# Patient Record
Sex: FEMALE | Race: BLACK OR AFRICAN AMERICAN | Employment: UNEMPLOYED | URBAN - METROPOLITAN AREA
[De-identification: names, ages, dates, MRNs, and addresses within clinical notes are randomized per-mention and may not be internally consistent; named-entity substitution may affect disease eponyms.]

---

## 2019-12-19 ENCOUNTER — OFFICE VISIT (OUTPATIENT)
Dept: ORTHOPEDIC SURGERY | Age: 69
End: 2019-12-19

## 2019-12-19 VITALS
HEIGHT: 64 IN | HEART RATE: 57 BPM | TEMPERATURE: 97 F | RESPIRATION RATE: 15 BRPM | SYSTOLIC BLOOD PRESSURE: 120 MMHG | BODY MASS INDEX: 48.49 KG/M2 | DIASTOLIC BLOOD PRESSURE: 50 MMHG | WEIGHT: 284 LBS

## 2019-12-19 DIAGNOSIS — M48.061 FORAMINAL STENOSIS OF LUMBAR REGION: Primary | ICD-10-CM

## 2019-12-19 DIAGNOSIS — E66.01 OBESITY, MORBID (HCC): ICD-10-CM

## 2019-12-19 DIAGNOSIS — M48.062 SPINAL STENOSIS, LUMBAR REGION WITH NEUROGENIC CLAUDICATION: ICD-10-CM

## 2019-12-19 RX ORDER — VITAMIN E 268 MG
400 CAPSULE ORAL DAILY
COMMUNITY

## 2019-12-19 NOTE — PROGRESS NOTES
HISTORY OF PRESENT ILLNESS    Derek Power is a 68y.o. year old female comes in today as new patient for: low back pain    Patients symptoms have been present for 18+ years. Pain level 10 - Worst pain ever/10 lower. It worsens with walking and during the day. Patient has tried:  Diclofenac, neurontin with benefit. It is described as pain low back worsening with time likely originally due to 2 different falls 2001. Had MRI Lumbar [id number] and \"nerve being pinched\". Does feel weakness with walking. Did PT his summer 2018 but \"did too much\" and seemed to make her worse. Was considering epidurals at [facility] but never heard back. IMAGING: XR lumbar 8/29/18 (Adrianna)  RADIOGRAPHIC STUDIES: Review of her imaging studies shows grade 1 spondylolisthesis at L4-L5, which appears to be stable on flexion extension views. There is lumbar stenosis at this level secondary to facet hypertrophy, which is severe. Past Surgical History:   Procedure Laterality Date    HX HYSTERECTOMY  1982    HX OTHER SURGICAL      gallbladder removal    HX OTHER SURGICAL      ORIF Tibia/Fibia    HX OTHER SURGICAL      varicose vein treatment     Social History     Socioeconomic History    Marital status:      Spouse name: Not on file    Number of children: Not on file    Years of education: Not on file    Highest education level: Not on file   Tobacco Use    Smoking status: Never Smoker    Smokeless tobacco: Never Used   Substance and Sexual Activity    Alcohol use: Not Currently      Current Outpatient Medications   Medication Sig Dispense Refill    vitamin E (AQUA GEMS) 400 unit capsule Take  by mouth daily.  ALBUTEROL IN Take 2 Puffs by inhalation.  calcium carbonate (OS-EBONY) 500 mg calcium (1,250 mg) tablet Take 1,250 mg by mouth.  cholecalciferol (VITAMIN D3) (2,000 UNITS /50 MCG) cap capsule Take 2,000 Units by mouth.  cyclobenzaprine (FLEXERIL) 10 mg tablet Take 10 mg by mouth.       conjugated estrogens (PREMARIN) 0.625 mg tablet Take 0.625 mg by mouth.  hydrocortisone (ANUSOL-HC) 25 mg supp Insert 25 mg into rectum.  loratadine (CLARITIN) 10 mg tablet Take 10 mg by mouth.  Phenazopyridine 97.5 mg tab Take  by mouth.  estradiol (ESTRACE) 0.01 % (0.1 mg/gram) vaginal cream Pea sized amount to urethra 2 x/week 42.5 g 2    oxybutynin chloride XL (DITROPAN XL) 10 mg CR tablet Take 1 Tab by mouth daily. 60 Tab 2    aspirin delayed-release 81 mg tablet Take 81 mg by mouth.  amLODIPine-benazepril (LOTREL) 10-20 mg per capsule Take 1 Cap by mouth.  atorvastatin (LIPITOR) 20 mg tablet Take 20 mg by mouth.  cyanocobalamin (VITAMIN B12) 500 mcg tablet Take 500 mcg by mouth.  diclofenac EC (VOLTAREN) 75 mg EC tablet Take 75 mg by mouth.  ergocalciferol (ERGOCALCIFEROL) 50,000 unit capsule Take 50,000 Units by mouth.  fexofenadine (ALLEGRA) 180 mg tablet Take 180 mg by mouth.  furosemide (LASIX) 20 mg tablet Take 20 mg by mouth.  gabapentin (NEURONTIN) 300 mg capsule Take 300 mg by mouth.  hydroCHLOROthiazide (HYDRODIURIL) 25 mg tablet Take 25 mg by mouth.  ibuprofen (MOTRIN) 800 mg tablet Take 800 mg by mouth.  levothyroxine (SYNTHROID) 50 mcg tablet Take 50 mcg by mouth.  meloxicam (MOBIC) 15 mg tablet Take 15 mg by mouth.  metoprolol tartrate (LOPRESSOR) 25 mg tablet Take 25 mg by mouth.  polyethylene glycol (MIRALAX) 17 gram/dose powder Take 17 g by mouth.  mometasone (NASONEX) 50 mcg/actuation nasal spray instill 2 sprays into each nostril once daily      omeprazole (PRILOSEC) 20 mg capsule Take 20 mg by mouth.  pregabalin (LYRICA) 75 mg capsule Take 75 mg by mouth.  rOPINIRole (REQUIP) 0.5 mg tablet Take 0.5 mg by mouth.  tiZANidine (ZANAFLEX) 4 mg tablet Take 2-4 mg by mouth.  traMADol-acetaminophen (ULTRACET) 37.5-325 mg per tablet Take  by mouth.       triamterene-hydroCHLOROthiazide (MAXZIDE) 37.5-25 mg per tablet Take  by mouth.  rivaroxaban (XARELTO) 20 mg tab tablet Take 20 mg by mouth. Past Medical History:   Diagnosis Date    Arthritis     Asthma     Diverticulosis     Dry eyes     Dry mouth     High cholesterol     Hypertension     Morbid obesity ([address]     Recurrent UTI     Right foot pain     Sleep apnea     Thyroid disease      Family History   Problem Relation Age of Onset    Diabetes Mother     Hypertension Mother     Hypertension Father     Heart Failure Father     Breast Cancer Sister        ROS:  + numbness both legs to feet. No incont, fever. All other systems reviewed and negative aside from that written in the HPI. Objective:  /50   Pulse (!) 57   Temp 97 °F (36.1 °C)   Resp 15   Ht 5' 4\" (1.626 m)   Wt 284 lb (128.8 kg)   BMI 48.75 kg/m²   GEN:  Appears stated age in NAD. NEURO:  Sensation intact to light touch. Reflexes +1/4 patellar and Achilles bilaterally. M/S:  Examined seated and supine. Slump positive B/L. LE Strength +5/5 bilaterally  EXT:  no clubbing/cyanosis. no edema. SKIN: Warm & dry w/o rash. HEENT: Conjunctiva/lids WNL. External canals/nares WNL. Tongue midline. PERRL, EOMI. Hearing intact. NECK: Trachea midline. Supple, Full ROM. No thyromegaly. CARDIAC: No edema. LUNGS: Normal effort. ABD: Soft, no masses. No HSM. PSYCH: A+O x3. Appropriate judgment and insight. Assessment/Plan:     ICD-10-CM ICD-9-CM    1. Foraminal stenosis of lumbar region M48.061 724.02    2. Obesity, morbid ([address] E66.01 278.01    3. Spinal stenosis, lumbar region with neurogenic claudication M48.062 724.03        Patient (or guardian if minor) verbalizes understanding of evaluation and plan.     Will refer PMR for consideration intervention as not adequate w/ PT/Rx and + Slump but wants to check Jesús Langford first.

## 2019-12-19 NOTE — PROGRESS NOTES
AVS reviewed: YES  HEP: N/A  Resources Provided: YES referral to Dr. Mary Leone  Patient questions/concerns answered: NO. Pt denied questions/concerns  Patient verbalized understanding of treatment plan: Adrián Hameed

## 2020-01-24 ENCOUNTER — OFFICE VISIT (OUTPATIENT)
Dept: ORTHOPEDIC SURGERY | Age: 70
End: 2020-01-24

## 2020-01-24 VITALS
OXYGEN SATURATION: 98 % | HEART RATE: 65 BPM | DIASTOLIC BLOOD PRESSURE: 76 MMHG | RESPIRATION RATE: 16 BRPM | HEIGHT: 64 IN | SYSTOLIC BLOOD PRESSURE: 172 MMHG | BODY MASS INDEX: 49.17 KG/M2 | WEIGHT: 288 LBS

## 2020-01-24 DIAGNOSIS — M47.816 LUMBAR SPONDYLOSIS: Primary | ICD-10-CM

## 2020-01-24 DIAGNOSIS — M54.16 LUMBAR NEURITIS: ICD-10-CM

## 2020-01-24 DIAGNOSIS — M51.36 DDD (DEGENERATIVE DISC DISEASE), LUMBAR: ICD-10-CM

## 2020-01-24 RX ORDER — GABAPENTIN 600 MG/1
600 TABLET ORAL 3 TIMES DAILY
Qty: 90 TAB | Refills: 1 | Status: SHIPPED | OUTPATIENT
Start: 2020-01-24 | End: 2021-03-05

## 2020-01-24 NOTE — LETTER
1/24/20 Patient: Corey Anthony YOB: 1950 Date of Visit: 1/24/2020 Luis Williamson MD 
[address] [facility] [id number] VIA Facsimile: [phone] DO [facility] [address] [place] [address] [address] VIA In Basket Dear MD Jimenez Brasher DO, Thank you for referring Ms. Ismael Perales to [address] for evaluation. My notes for this consultation are attached. If you have questions, please do not hesitate to call me. I look forward to following your patient along with you. Sincerely, Nghia King MD

## 2020-01-24 NOTE — PROGRESS NOTES
[facility]  [address], [address]  Phone: [phone]  Fax: [phone]        INITIAL CONSULTATION      HISTORY OF PRESENT ILLNESS:  Leeanna Kapadia is a 68 y.o. female whom is referred from Dr. Cesar Morales secondary to chronic low back pain x 18 years radiating into the BLE to the knees (R>L). She rates her pain 10/10. Pain exacerbated with standing and walking. Relieved with sitting. Positive shopping cart sign. Per pt, she had a MRI within the past year, but is unsure where or when it was done. No films or reports are available for review. Patient is a poor historian. She denies current use of blood thinners or h/o kidney issues. Per pt, she previously had vascular surgery BLE. She reports bladder incontinence x 3 months, her symptoms have started to improve and she is followed by a urologist. Patient previously tolerated Lyrica. Patient denies previous spinal surgery, injections or chiropractic care. She had PT in summer of 2018 without improvement. PmHx of recurrent UTIs and arthritis. Pt denies change in bowel habits. Pt denies fever or skin changes. She recently lost 20 lbs in the past month, her PCP is aware, and she continues to lose weight. Note from Dr. Cesar Morales dated 12/19/2019 indicating patient was seen with c/o low back pain x 18 years. The likely origin was 2 different falls in 2001. She rated her pain a 10/10. The pain is worse with walking and standing. She's been treated with Diclofenac and Neurontin 300 mg TID with benefit. She completed PT in 2018 without relief. She reports with BLE weakness. Injections with [facility] were considered, but never heard back. L Spine XR dated 8/29/18 Lou Boland) revealed: grade 1 spondylolisthesis at L4-L5, which appears to be stable on flexion extension views. There is lumbar stenosis at this level secondary to facet hypertrophy, which is severe. The patient is RHD.  reviewed.  Body mass index is 49.44 kg/m².    PCP: Ashley Narvaez MD    Past Medical History:   Diagnosis Date    Arthritis     Asthma     Diverticulosis     Dry eyes     Dry mouth     High cholesterol     Hypertension     Morbid obesity ([address]     Recurrent UTI     Right foot pain     Sleep apnea     Thyroid disease    ever  Past Surgical History:   Procedure Laterality Date    HX HYSTERECTOMY  1982    HX OTHER SURGICAL      gallbladder removal    HX OTHER SURGICAL      ORIF Tibia/Fibia    HX OTHER SURGICAL      varicose vein treatment    Smoker    Smokeless tobacco: Never Used   Substance Use Topics    Alcohol use: Not Currently       Current Outpatient Medications   Medication Sig Dispense Refill    vitamin E (AQUA GEMS) 400 unit capsule Take  by mouth daily.  ALBUTEROL IN Take 2 Puffs by inhalation.  calcium carbonate (OS-EBONY) 500 mg calcium (1,250 mg) tablet Take 1,250 mg by mouth.  cholecalciferol (VITAMIN D3) (2,000 UNITS /50 MCG) cap capsule Take 2,000 Units by mouth.  cyclobenzaprine (FLEXERIL) 10 mg tablet Take 10 mg by mouth.  conjugated estrogens (PREMARIN) 0.625 mg tablet Take 0.625 mg by mouth.  hydrocortisone (ANUSOL-HC) 25 mg supp Insert 25 mg into rectum.  loratadine (CLARITIN) 10 mg tablet Take 10 mg by mouth.  Phenazopyridine 97.5 mg tab Take  by mouth.  rivaroxaban (XARELTO) 20 mg tab tablet Take 20 mg by mouth.  estradiol (ESTRACE) 0.01 % (0.1 mg/gram) vaginal cream Pea sized amount to urethra 2 x/week 42.5 g 2    oxybutynin chloride XL (DITROPAN XL) 10 mg CR tablet Take 1 Tab by mouth daily. 60 Tab 2    aspirin delayed-release 81 mg tablet Take 81 mg by mouth.  amLODIPine-benazepril (LOTREL) 10-20 mg per capsule Take 1 Cap by mouth.  atorvastatin (LIPITOR) 20 mg tablet Take 20 mg by mouth.  cyanocobalamin (VITAMIN B12) 500 mcg tablet Take 500 mcg by mouth.  diclofenac EC (VOLTAREN) 75 mg EC tablet Take 75 mg by mouth.       ergocalciferol (ERGOCALCIFEROL) 50,000 unit capsule Take 50,000 Units by mouth.  fexofenadine (ALLEGRA) 180 mg tablet Take 180 mg by mouth.  furosemide (LASIX) 20 mg tablet Take 20 mg by mouth.  gabapentin (NEURONTIN) 300 mg capsule Take 300 mg by mouth.  hydroCHLOROthiazide (HYDRODIURIL) 25 mg tablet Take 25 mg by mouth.  ibuprofen (MOTRIN) 800 mg tablet Take 800 mg by mouth.  levothyroxine (SYNTHROID) 50 mcg tablet Take 50 mcg by mouth.  meloxicam (MOBIC) 15 mg tablet Take 15 mg by mouth.  metoprolol tartrate (LOPRESSOR) 25 mg tablet Take 25 mg by mouth.  polyethylene glycol (MIRALAX) 17 gram/dose powder Take 17 g by mouth.  mometasone (NASONEX) 50 mcg/actuation nasal spray instill 2 sprays into each nostril once daily      omeprazole (PRILOSEC) 20 mg capsule Take 20 mg by mouth.  pregabalin (LYRICA) 75 mg capsule Take 75 mg by mouth.  rOPINIRole (REQUIP) 0.5 mg tablet Take 0.5 mg by mouth.  tiZANidine (ZANAFLEX) 4 mg tablet Take 2-4 mg by mouth.  traMADol-acetaminophen (ULTRACET) 37.5-325 mg per tablet Take  by mouth.  triamterene-hydroCHLOROthiazide (MAXZIDE) 37.5-25 mg per tablet Take  by mouth.          Allergies   Allergen Reactions    Latex, Natural Rubber Other (comments)    Penicillins Hives, Itching, Other (comments) and Unknown (comments)    Nickel Itching and Rash     Nickel dermatitis      Dimetapp Cold And Flu Other (comments)    Pseudoephedrine-Dm Other (comments)    Ranitidine Hcl Itching and Other (comments)     Medication interaction      Aspirin Other (comments) and Palpitations            Family History   Problem Relation Age of Onset    Diabetes Mother     Hypertension Mother     Hypertension Father     Heart Failure Father     Breast Cancer Sister          REVIEW OF SYSTEMS  Constitutional symptoms: Negative  Eyes: Negative  Ears, Nose, Throat, and Mouth: Negative  Cardiovascular: Negative  Respiratory: Negative  Genitourinary: Negative  Integumentary (Skin and/or breast): Negative  Musculoskeletal: Positive for lumbar pain  Extremities: Negative for edema. Endocrine/Rheumatologic: Negative  Hematologic/Lymphatic: Negative  Allergic/Immunologic: Negative  Psychiatric: Negative       PHYSICAL EXAMINATION  Visit Vitals  /76   Pulse 65   Resp 16   Ht 5' 4\" (1.626 m)   Wt 288 lb (130.6 kg)   SpO2 98%   BMI 49.44 kg/m²       CONSTITUTIONAL: NAD, A&O x 3  HEART: Regular rate and rhythm  ABDOMEN: Positive bowel sounds, soft, nontender, and nondistended  LUNGS: Clear to auscultation bilaterally. SKIN: Negative for rash. RANGE OF MOTION: The patient has full passive range of motion in all four extremities. SENSATION: Sensation is intact to light touch throughout. MOTOR:   Straight Leg Raise: Negative, bilateral  Arzate: Negative, bilateral  Deep tendon reflexes are 1 at the biceps, 0 at the triceps, and 0 at the brachioradialis bilaterally. Deep tendon reflexes are 0 at the knees and 0 at the ankles bilaterally. Shoulder AB/Flex Elbow Flex Wrist Ext Elbow Ext Wrist Flex Hand Intrin Tone   Right +4/5 +4/5 +4/5 +4/5 +4/5 +4/5 +4/5   Left +4/5 +4/5 +4/5 +4/5 +4/5 +4/5 +4/5              Hip Flex Knee Ext Knee Flex Ankle DF GTE Ankle PF Tone   Right +4/5 +4/5 +4/5 +4/5 +4/5 +4/5 +4/5   Left +4/5 +4/5 +4/5 +4/5 +4/5 +4/5 +4/5   ASSESSMENT   Diagnoses and all orders for this visit:    1. Lumbar spondylosis    2. Lumbar neuritis    3. DDD (degenerative disc disease), lumbar    Other orders  -     gabapentin (NEURONTIN) 600 mg tablet; Take 1 Tab by mouth three (3) times daily. Max Daily Amount: 1,800 mg. IMPRESSIONS/RECOMMENDATIONS:  Patient presents today with c/o chronic low back pain extending into the BLE to the knee (R>L). Multiple treatment options were discussed. I will increase her Neurontin from 300 mg TID to 600 mg TID. Patient advised to call the office if intolerant to higher dose.  I asked her to find the MRI films and bring them to the next visit. Patient is neurologically intact. I will see the patient back in 1 month's time or earlier if needed. Written by Alma Delia Emmanuel, as dictated by Jeffrey Mcdonald MD  I examined the patient, reviewed and agree with the note.

## 2020-02-21 NOTE — PROGRESS NOTES
[facility]  [address], [address]  Phone: [phone]  Fax: [phone]        PROGRESS NOTE      HISTORY OF PRESENT ILLNESS:  The patient is a 68 y.o. female and was seen today for follow up of chronic low back pain x 18 years radiating into the BLE to the knees (R>L). Pain exacerbated with standing and walking. Relieved with sitting. Positive shopping cart sign. Per pt, she had a MRI within the past year, but is unsure where or when it was done. No films or reports are available for review. Patient is a poor historian. She denies current use of blood thinners or h/o kidney issues. Per pt, she previously had vascular surgery BLE. She reports bladder incontinence x 3 months, her symptoms have started to improve and she is followed by a urologist. Patient previously tolerated Lyrica. Patient denies previous spinal surgery, injections or chiropractic care. She had PT in summer of 2018 without improvement. PmHx of recurrent UTIs and arthritis. Pt denies change in bowel habits. Pt denies fever or skin changes. She recently lost 20 lbs in the past month, her PCP is aware, and she continues to lose weight. The patient is RHD. Note from Dr. Philip Kyle dated 12/19/2019 indicating patient was seen with c/o low back pain x 18 years. The likely origin was 2 different falls in 2001. She rated her pain a 10/10. The pain is worse with walking and standing. She's been treated with Diclofenac and Neurontin 300 mg TID with benefit. She completed PT in 2018 without relief. She reports with BLE weakness. Injections with [facility] were considered, but never heard back. L Spine XR dated 8/29/18 DR BRIT RICE [facility]) revealed: grade 1 spondylolisthesis at L4-L5, which appears to be stable on flexion extension views. There is lumbar stenosis at this level secondary to facet hypertrophy, which is severe. At her last clinical appointment,  I increased her Neurontin from 300 mg TID to 600 mg TID.  I also requested she locate her MRI films and bring them to the next visit. The patient returns today with pain location and distribution remain unchanged. She rates her pain 6/10, previously 10/10. Patient is tolerating the higher dose of Neurontin 600 mg TID with benefit. Pt denies change in bowel or bladder habits. Patient reports previous experience with PT increased her pain. Note from Dr. Manny Lemons dated 2/4/2020 indicating patient was seen with c/o bilateral knee pain. Indicated patient has osteoarthritis of both knees. Treated with injections and provided hydrocodone. L Spine MRI dated 8/16/2018 films reviewed. Per report, At L2-3: 5 mm left foraminal disc herniation leading to mild to moderate narrowing. At L3-4: broad based small to moderate left posterolateral to foraminal disc herniation with mild to moderate left foraminal narrowing. No evidence of lateral recess stenosis. At L4-5: small right central disc herniation with mild right lateral recess narrowing. Advanced bilateral facet arthropathy.  reviewed. Body mass index is 47.48 kg/m².     PCP: Norvin Phalen, MD      Past Medical History:   Diagnosis Date    Arthritis     Asthma     Diverticulosis     Dry eyes     Dry mouth     High cholesterol     Hypertension     Morbid obesity ([address]     Recurrent UTI     Right foot pain     Sleep apnea     Thyroid disease     Urinary incontinence         Social History     Socioeconomic History    Marital status:      Spouse name: Not on file    Number of children: Not on file    Years of education: Not on file    Highest education level: Not on file   Occupational History    Not on file   Social Needs    Financial resource strain: Not on file    Food insecurity:     Worry: Not on file     Inability: Not on file    Transportation needs:     Medical: Not on file     Non-medical: Not on file   Tobacco Use    Smoking status: Never Smoker    Smokeless tobacco: Never Used   Substance and Sexual Activity    Alcohol use: Not Currently    Drug use: Not on file    Sexual activity: Not on file   Lifestyle    Physical activity:     Days per week: Not on file     Minutes per session: Not on file    Stress: Not on file   Relationships    Social connections:     Talks on phone: Not on file     Gets together: Not on file     Attends Moravian service: Not on file     Active member of club or organization: Not on file     Attends meetings of clubs or organizations: Not on file     Relationship status: Not on file    Intimate partner violence:     Fear of current or ex partner: Not on file     Emotionally abused: Not on file     Physically abused: Not on file     Forced sexual activity: Not on file   Other Topics Concern    Not on file   Social History Narrative    Not on file       Current Outpatient Medications   Medication Sig Dispense Refill    predniSONE (DELTASONE) 20 mg tablet Take 2 tab once daily for 4 days then 1 tab daily for 4 days      HYDROcodone-acetaminophen (NORCO) 5-325 mg per tablet Take 1 Tab by mouth.  doxycycline (VIBRAMYCIN) 100 mg capsule TK 1 C PO Q 12 H      benzonatate (TESSALON) 100 mg capsule Take 100 mg by mouth.  albuterol (PROVENTIL HFA, VENTOLIN HFA, PROAIR HFA) 90 mcg/actuation inhaler INHALE 2 PUFFS PO Q 4 H      estradioL (ESTRACE) 0.01 % (0.1 mg/gram) vaginal cream Pea sized amount to urethra 2 x/week 42.5 g 3    oxybutynin chloride XL (DITROPAN XL) 10 mg CR tablet Take 1 Tab by mouth daily. 90 Tab 3    gabapentin (NEURONTIN) 600 mg tablet Take 1 Tab by mouth three (3) times daily. Max Daily Amount: 1,800 mg. 90 Tab 1    vitamin E (AQUA GEMS) 400 unit capsule Take  by mouth daily.  ALBUTEROL IN Take 2 Puffs by inhalation.  calcium carbonate (OS-EBONY) 500 mg calcium (1,250 mg) tablet Take 1,250 mg by mouth.  cholecalciferol (VITAMIN D3) (2,000 UNITS /50 MCG) cap capsule Take 2,000 Units by mouth.       cyclobenzaprine (FLEXERIL) 10 mg tablet Take 10 mg by mouth.  conjugated estrogens (PREMARIN) 0.625 mg tablet Take 0.625 mg by mouth.  hydrocortisone (ANUSOL-HC) 25 mg supp Insert 25 mg into rectum.  loratadine (CLARITIN) 10 mg tablet Take 10 mg by mouth.  Phenazopyridine 97.5 mg tab Take  by mouth.  aspirin delayed-release 81 mg tablet Take 81 mg by mouth.  amLODIPine-benazepril (LOTREL) 10-20 mg per capsule Take 1 Cap by mouth.  atorvastatin (LIPITOR) 20 mg tablet Take 20 mg by mouth.  cyanocobalamin (VITAMIN B12) 500 mcg tablet Take 500 mcg by mouth.  diclofenac EC (VOLTAREN) 75 mg EC tablet Take 75 mg by mouth.  ergocalciferol (ERGOCALCIFEROL) 50,000 unit capsule Take 50,000 Units by mouth.  fexofenadine (ALLEGRA) 180 mg tablet Take 180 mg by mouth.  furosemide (LASIX) 20 mg tablet Take 20 mg by mouth.  gabapentin (NEURONTIN) 300 mg capsule Take 300 mg by mouth.  hydroCHLOROthiazide (HYDRODIURIL) 25 mg tablet Take 25 mg by mouth.  ibuprofen (MOTRIN) 800 mg tablet Take 800 mg by mouth.  levothyroxine (SYNTHROID) 50 mcg tablet Take 50 mcg by mouth.  meloxicam (MOBIC) 15 mg tablet Take 15 mg by mouth.  metoprolol tartrate (LOPRESSOR) 25 mg tablet Take 25 mg by mouth.  polyethylene glycol (MIRALAX) 17 gram/dose powder Take 17 g by mouth.  mometasone (NASONEX) 50 mcg/actuation nasal spray instill 2 sprays into each nostril once daily      omeprazole (PRILOSEC) 20 mg capsule Take 20 mg by mouth.  pregabalin (LYRICA) 75 mg capsule Take 75 mg by mouth.  rOPINIRole (REQUIP) 0.5 mg tablet Take 0.5 mg by mouth.  tiZANidine (ZANAFLEX) 4 mg tablet Take 2-4 mg by mouth.  traMADol-acetaminophen (ULTRACET) 37.5-325 mg per tablet Take  by mouth.  triamterene-hydroCHLOROthiazide (MAXZIDE) 37.5-25 mg per tablet Take  by mouth.  rivaroxaban (XARELTO) 20 mg tab tablet Take 20 mg by mouth.          Allergies   Allergen Reactions    Latex, Natural Rubber Other (comments)    Penicillins Hives, Itching, Other (comments) and Unknown (comments)    Nickel Itching and Rash     Nickel dermatitis      Dimetapp Cold And Flu Other (comments)    Pseudoephedrine-Dm Other (comments)    Ranitidine Hcl Itching and Other (comments)     Medication interaction      Aspirin Other (comments) and Palpitations          PHYSICAL EXAMINATION    Visit Vitals  BP (!) 131/97 (BP 1 Location: Left arm, BP Patient Position: Sitting)   Pulse 60   Temp 97.7 °F (36.5 °C) (Oral)   Resp 16   Ht 5' 4\" (1.626 m)   Wt 276 lb 9.6 oz (125.5 kg)   SpO2 99%   BMI 47.48 kg/m²       CONSTITUTIONAL: NAD, A&O x 3  SENSATION: Intact to light touch throughout  RANGE OF MOTION: The patient has full passive range of motion in all four extremities. MOTOR:  Straight Leg Raise: Negative, bilateral     Hip Flex Knee Ext Knee Flex Ankle DF GTE Ankle PF Tone   Right +4/5 +4/5 +4/5 +4/5 +4/5 +4/5 +4/5   Left +4/5 +4/5 +4/5 +4/5 +4/5 +4/5 +4/5       ASSESSMENT   Diagnoses and all orders for this visit:    1. Lumbar spondylosis    2. Lumbar neuritis    3. DDD (degenerative disc disease), lumbar    4. Spondylolisthesis, acquired    Other orders  -     gabapentin (NEURONTIN) 800 mg tablet; Take 1 Tab by mouth three (3) times daily. Max Daily Amount: 2,400 mg. IMPRESSION AND PLAN:  Patient returns to the office today with c/o chronic low back pain x 18 years radiating into the BLE to the knees (R>L). Multiple treatment options were discussed. I offered to refer her to PT, pt declined. I will increase her Neurontin from 600 mg TID to 800 mg TID. Patient advised to call the office if intolerant to higher dose. Patient is neurologically intact. I will see the patient back in 1 month's time or earlier if needed. Written by Sourav Ramachandran, as dictated by Rogelio Burroughs MD  I examined the patient, reviewed and agree with the note.

## 2020-02-28 ENCOUNTER — OFFICE VISIT (OUTPATIENT)
Dept: ORTHOPEDIC SURGERY | Age: 70
End: 2020-02-28

## 2020-02-28 VITALS
OXYGEN SATURATION: 99 % | DIASTOLIC BLOOD PRESSURE: 97 MMHG | TEMPERATURE: 97.7 F | HEIGHT: 64 IN | RESPIRATION RATE: 16 BRPM | SYSTOLIC BLOOD PRESSURE: 131 MMHG | WEIGHT: 276.6 LBS | HEART RATE: 60 BPM | BODY MASS INDEX: 47.22 KG/M2

## 2020-02-28 DIAGNOSIS — M51.36 DDD (DEGENERATIVE DISC DISEASE), LUMBAR: ICD-10-CM

## 2020-02-28 DIAGNOSIS — M47.816 LUMBAR SPONDYLOSIS: Primary | ICD-10-CM

## 2020-02-28 DIAGNOSIS — M43.10 SPONDYLOLISTHESIS, ACQUIRED: ICD-10-CM

## 2020-02-28 DIAGNOSIS — M54.16 LUMBAR NEURITIS: ICD-10-CM

## 2020-02-28 RX ORDER — GABAPENTIN 800 MG/1
800 TABLET ORAL 3 TIMES DAILY
Qty: 90 TAB | Refills: 1 | Status: SHIPPED | OUTPATIENT
Start: 2020-02-28

## 2020-02-28 NOTE — LETTER
2/28/20 Patient: Halle Daly YOB: 1950 Date of Visit: 2/28/2020 Andres Tamayo MD 
[address] [facility] [id number] VIA Facsimile: [phone] Dear Andres Tamayo MD, Thank you for referring Ms. Gabrielle Schmitt to [address] for evaluation. My notes for this consultation are attached. If you have questions, please do not hesitate to call me. I look forward to following your patient along with you. Sincerely, Juliano Gurrola MD

## 2020-05-15 ENCOUNTER — VIRTUAL VISIT (OUTPATIENT)
Dept: ORTHOPEDIC SURGERY | Age: 70
End: 2020-05-15

## 2020-05-15 DIAGNOSIS — M47.816 LUMBAR SPONDYLOSIS: Primary | ICD-10-CM

## 2020-05-15 DIAGNOSIS — M43.10 SPONDYLOLISTHESIS, ACQUIRED: ICD-10-CM

## 2020-05-15 DIAGNOSIS — M51.36 DDD (DEGENERATIVE DISC DISEASE), LUMBAR: ICD-10-CM

## 2020-05-15 DIAGNOSIS — M54.16 LUMBAR NEURITIS: ICD-10-CM

## 2020-05-15 RX ORDER — GABAPENTIN 300 MG/1
300 CAPSULE ORAL 3 TIMES DAILY
Qty: 90 CAP | Refills: 1 | Status: SHIPPED | OUTPATIENT
Start: 2020-05-15 | End: 2020-06-02
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
## 2020-06-10 ENCOUNTER — TELEPHONE (OUTPATIENT)
Dept: ORTHOPEDIC SURGERY | Age: 70
End: 2020-06-10

## 2020-06-10 DIAGNOSIS — M47.816 LUMBAR SPONDYLOSIS: Primary | ICD-10-CM

## 2020-06-10 RX ORDER — DIAZEPAM 10 MG/1
TABLET ORAL
Qty: 1 TAB | Refills: 0 | Status: SHIPPED | OUTPATIENT
Start: 2020-06-10 | End: 2021-03-05
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
## 2020-12-09 ENCOUNTER — OFFICE VISIT (OUTPATIENT)
Dept: ORTHOPEDIC SURGERY | Age: 70
End: 2020-12-09
Payer: MEDICARE

## 2020-12-09 VITALS
RESPIRATION RATE: 20 BRPM | HEART RATE: 58 BPM | BODY MASS INDEX: 44.22 KG/M2 | OXYGEN SATURATION: 99 % | SYSTOLIC BLOOD PRESSURE: 136 MMHG | DIASTOLIC BLOOD PRESSURE: 72 MMHG | HEIGHT: 64 IN | WEIGHT: 259 LBS | TEMPERATURE: 98.2 F

## 2020-12-09 DIAGNOSIS — M47.816 LUMBAR SPONDYLOSIS: Primary | ICD-10-CM

## 2020-12-09 DIAGNOSIS — M51.36 DDD (DEGENERATIVE DISC DISEASE), LUMBAR: ICD-10-CM

## 2020-12-09 DIAGNOSIS — M54.16 LUMBAR NEURITIS: ICD-10-CM

## 2020-12-09 DIAGNOSIS — M43.10 SPONDYLOLISTHESIS, ACQUIRED: ICD-10-CM

## 2020-12-09 PROCEDURE — 99213 OFFICE O/P EST LOW 20 MIN: CPT | Performed by: PHYSICAL MEDICINE & REHABILITATION

## 2020-12-09 RX ORDER — LIDOCAINE 50 MG/G
PATCH TOPICAL
COMMUNITY
Start: 2020-11-14 | End: 2021-03-05

## 2020-12-09 RX ORDER — OXYCODONE AND ACETAMINOPHEN 5; 325 MG/1; MG/1
1 TABLET ORAL
COMMUNITY
Start: 2020-12-09 | End: 2021-03-05

## 2020-12-09 NOTE — LETTER
12/9/20 Patient: Jeremiah Blizzard YOB: 1950 Date of Visit: 12/9/2020 Alice Corona MD 
[address] [place] [phone] VIA Facsimile: [phone] Dear Alice Corona MD, Thank you for referring Ms. Claudine Ro to [address] for evaluation. My notes for this consultation are attached. If you have questions, please do not hesitate to call me. I look forward to following your patient along with you. Sincerely, Dusty Cordon MD

## 2021-02-05 ENCOUNTER — TELEPHONE (OUTPATIENT)
Dept: ORTHOPEDIC SURGERY | Age: 71
End: 2021-02-05

## 2021-02-05 DIAGNOSIS — M47.816 LUMBAR SPONDYLOSIS: Primary | ICD-10-CM

## 2021-02-05 RX ORDER — DIAZEPAM 10 MG/1
10 TABLET ORAL ONCE
Qty: 1 TAB | Refills: 0 | Status: SHIPPED | OUTPATIENT
Start: 2021-02-05 | End: 2021-02-05
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
## 2021-03-03 ENCOUNTER — OFFICE VISIT (OUTPATIENT)
Dept: ORTHOPEDIC SURGERY | Age: 71
End: 2021-03-03
Payer: MEDICARE

## 2021-03-03 VITALS
TEMPERATURE: 97.2 F | BODY MASS INDEX: 44.39 KG/M2 | RESPIRATION RATE: 18 BRPM | SYSTOLIC BLOOD PRESSURE: 135 MMHG | HEIGHT: 64 IN | HEART RATE: 58 BPM | OXYGEN SATURATION: 100 % | DIASTOLIC BLOOD PRESSURE: 53 MMHG | WEIGHT: 260 LBS

## 2021-03-03 DIAGNOSIS — M54.16 LUMBAR NEURITIS: ICD-10-CM

## 2021-03-03 DIAGNOSIS — M47.816 LUMBAR SPONDYLOSIS: Primary | ICD-10-CM

## 2021-03-03 DIAGNOSIS — M43.10 SPONDYLOLISTHESIS, ACQUIRED: ICD-10-CM

## 2021-03-03 DIAGNOSIS — M51.36 DDD (DEGENERATIVE DISC DISEASE), LUMBAR: ICD-10-CM

## 2021-03-03 PROCEDURE — G8427 DOCREV CUR MEDS BY ELIG CLIN: HCPCS | Performed by: PHYSICAL MEDICINE & REHABILITATION

## 2021-03-03 PROCEDURE — 1101F PT FALLS ASSESS-DOCD LE1/YR: CPT | Performed by: PHYSICAL MEDICINE & REHABILITATION

## 2021-03-03 PROCEDURE — G8432 DEP SCR NOT DOC, RNG: HCPCS | Performed by: PHYSICAL MEDICINE & REHABILITATION

## 2021-03-03 PROCEDURE — 99213 OFFICE O/P EST LOW 20 MIN: CPT | Performed by: PHYSICAL MEDICINE & REHABILITATION

## 2021-03-03 PROCEDURE — G8419 CALC BMI OUT NRM PARAM NOF/U: HCPCS | Performed by: PHYSICAL MEDICINE & REHABILITATION

## 2021-03-03 PROCEDURE — 3017F COLORECTAL CA SCREEN DOC REV: CPT | Performed by: PHYSICAL MEDICINE & REHABILITATION

## 2021-03-03 PROCEDURE — 1090F PRES/ABSN URINE INCON ASSESS: CPT | Performed by: PHYSICAL MEDICINE & REHABILITATION

## 2021-03-03 PROCEDURE — G8399 PT W/DXA RESULTS DOCUMENT: HCPCS | Performed by: PHYSICAL MEDICINE & REHABILITATION

## 2021-03-03 PROCEDURE — G8536 NO DOC ELDER MAL SCRN: HCPCS | Performed by: PHYSICAL MEDICINE & REHABILITATION

## 2021-03-03 NOTE — LETTER
3/3/2021 Patient: Sherren Roux YOB: 1950 Date of Visit: 3/3/2021 Ana Pope MD 
[address] [facility] [phone] Via Fax: [phone] Dear Ana Pope MD, Thank you for referring Ms. Ash Cheema to [address] for evaluation. My notes for this consultation are attached. If you have questions, please do not hesitate to call me. I look forward to following your patient along with you. Sincerely, Kimmie Osborne MD

## 2021-03-23 ENCOUNTER — OFFICE VISIT (OUTPATIENT)
Dept: ORTHOPEDIC SURGERY | Age: 71
End: 2021-03-23
Payer: MEDICARE

## 2021-03-23 VITALS
TEMPERATURE: 97.5 F | BODY MASS INDEX: 47.52 KG/M2 | HEIGHT: 63 IN | HEART RATE: 57 BPM | OXYGEN SATURATION: 99 % | WEIGHT: 268.2 LBS

## 2021-03-23 DIAGNOSIS — M25.562 ACUTE PAIN OF LEFT KNEE: ICD-10-CM

## 2021-03-23 DIAGNOSIS — M17.0 ARTHRITIS OF BOTH KNEES: ICD-10-CM

## 2021-03-23 DIAGNOSIS — I89.0 LYMPHEDEMA OF EXTREMITY: ICD-10-CM

## 2021-03-23 DIAGNOSIS — M25.561 ACUTE PAIN OF RIGHT KNEE: Primary | ICD-10-CM

## 2021-03-23 DIAGNOSIS — M25.661 DECREASED RANGE OF MOTION (ROM) OF BOTH KNEES: ICD-10-CM

## 2021-03-23 DIAGNOSIS — M25.662 DECREASED RANGE OF MOTION (ROM) OF BOTH KNEES: ICD-10-CM

## 2021-03-23 PROCEDURE — 99213 OFFICE O/P EST LOW 20 MIN: CPT | Performed by: PHYSICIAN ASSISTANT

## 2021-03-23 PROCEDURE — G8432 DEP SCR NOT DOC, RNG: HCPCS | Performed by: PHYSICIAN ASSISTANT

## 2021-03-23 PROCEDURE — 1101F PT FALLS ASSESS-DOCD LE1/YR: CPT | Performed by: PHYSICIAN ASSISTANT

## 2021-03-23 PROCEDURE — 1090F PRES/ABSN URINE INCON ASSESS: CPT | Performed by: PHYSICIAN ASSISTANT

## 2021-03-23 PROCEDURE — G8417 CALC BMI ABV UP PARAM F/U: HCPCS | Performed by: PHYSICIAN ASSISTANT

## 2021-03-23 PROCEDURE — G8536 NO DOC ELDER MAL SCRN: HCPCS | Performed by: PHYSICIAN ASSISTANT

## 2021-03-23 PROCEDURE — 73562 X-RAY EXAM OF KNEE 3: CPT | Performed by: PHYSICIAN ASSISTANT

## 2021-03-23 PROCEDURE — G8427 DOCREV CUR MEDS BY ELIG CLIN: HCPCS | Performed by: PHYSICIAN ASSISTANT

## 2021-03-23 PROCEDURE — 3017F COLORECTAL CA SCREEN DOC REV: CPT | Performed by: PHYSICIAN ASSISTANT

## 2021-03-23 PROCEDURE — 20610 DRAIN/INJ JOINT/BURSA W/O US: CPT | Performed by: PHYSICIAN ASSISTANT

## 2021-03-23 PROCEDURE — G8399 PT W/DXA RESULTS DOCUMENT: HCPCS | Performed by: PHYSICIAN ASSISTANT

## 2021-03-23 RX ORDER — TRIAMCINOLONE ACETONIDE 40 MG/ML
40 INJECTION, SUSPENSION INTRA-ARTICULAR; INTRAMUSCULAR ONCE
Status: COMPLETED | OUTPATIENT
Start: 2021-03-23 | End: 2021-03-23

## 2021-03-23 RX ADMIN — TRIAMCINOLONE ACETONIDE 40 MG: 40 INJECTION, SUSPENSION INTRA-ARTICULAR; INTRAMUSCULAR at 09:30

## 2021-03-23 NOTE — PROGRESS NOTES
Patient: Quyen Andrews                MRN: [id number]       SSN: [id number]  YOB: 1950        AGE: 65 y.o. SEX: female          PCP: Syed Mayorga MD  03/23/21    Chief Complaint   Patient presents with    Knee Pain     right, left       HISTORY:  Quyen Andrews is a 65 y.o. female who presents to the office with a bilateral knee pain. She recently had a loss of a relative out of state in the [facility] area and traveled by car to that area. During the course of the travel she developed acute pain and swelling to both legs. She has no history of blood clots. She was seen through her PCP Max Patricia Eddie NIEVES through [facility] and bilateral Dopplers were ordered which were negative for acute thrombus however the studies were limited secondary to the excessive swelling of both legs. Ms. Uriel Harrison has not had a history of swelling like this before. She does not have any history of lymphedema. She also has seen cardiology for heart related high blood pressure management. She is currently on medication administered by the cardiologist in her internist managing her cardiopulmonary symptoms. Today she denies any chest pain no shortness of breath however that is at rest when she has occurrence to walk short distances or climb and descend stairs she has exertional dyspnea. Recent as 3/15/2021 she fell at home striking both knees. She has been able to walk with pain however since the fall. There was no evidence of loss of consciousness prior to or following the fall. Pain to both knees today dull and aching characteristic at rest left slightly greater than right carrying a pain rating of 3-4 on a 10 point scale. With activity to include short distance walking, short periods of standing, standing from a sitting position, and sitting from a standing position patient has increased pain to upwards of an 8-9 on a 10 point scale.   Again left worse than right. No locking or give way reported. She does ambulate with a single post cane.       Pain Assessment  3/23/2021   Location of Pain Knee   Location Modifiers Right;Left   Severity of Pain 6   Quality of Pain Dull   Quality of Pain Comment swelling   Duration of Pain Persistent   Frequency of Pain Intermittent   Aggravating Factors Standing;Walking   Aggravating Factors Comment -   Limiting Behavior Yes   Relieving Factors Heat;Elevation   Relieving Factors Comment -   Result of Injury No   Type of Injury -           No results found for: HBA1C, HGBE8, FNO2XYTO, TWR3FKJD  Weight Metrics 3/23/2021 3/5/2021 3/3/2021 12/9/2020 9/4/2020 6/2/2020 2/28/2020   Weight 268 lb 3.2 oz 262 lb 260 lb 259 lb 267 lb 276 lb 276 lb 9.6 oz   BMI 47.51 kg/m2 46.41 kg/m2 45.33 kg/m2 44.46 kg/m2 45.83 kg/m2 47.38 kg/m2 47.48 kg/m2            Problem List Items Addressed This Visit     None      Visit Diagnoses     Acute pain of right knee    -  Primary    Relevant Orders    AMB POC X-RAY KNEE 3 VIEW (Completed)    Acute pain of left knee        Relevant Medications    triamcinolone acetonide (KENALOG-40) 40 mg/mL injection 40 mg (Start on 3/23/2021 10:00 AM)    Other Relevant Orders    AMB POC X-RAY KNEE 3 VIEW (Completed)    DRAIN/INJECT LARGE JOINT/BURSA    Lymphedema of extremity        Relevant Orders    REFERRAL TO PHYSICAL THERAPY    Arthritis of both knees        Relevant Medications    triamcinolone acetonide (KENALOG-40) 40 mg/mL injection 40 mg (Start on 3/23/2021 10:00 AM)    BMI 45.0-49.9, adult (HCC)        Decreased range of motion (ROM) of both knees              PAST MEDICAL HISTORY:   Past Medical History:   Diagnosis Date    Arthritis     Asthma     Diverticulosis     Dry eyes     Dry mouth     High cholesterol     Hypertension     Morbid obesity (HCC)     Recurrent UTI     Right foot pain     Sleep apnea     Thyroid disease     Urinary incontinence        PAST SURGICAL HISTORY:   Past Surgical History:   Procedure Laterality Date    HX HYSTERECTOMY  1982    HX OTHER SURGICAL      gallbladder removal    HX OTHER SURGICAL      ORIF Tibia/Fibia    HX OTHER SURGICAL      varicose vein treatment       ALLERGIES:   Allergies   Allergen Reactions    Latex, Natural Rubber Other (comments)    Penicillins Hives, Itching, Other (comments) and Unknown (comments)    Nickel Itching and Rash     Nickel dermatitis      Dimetapp Cold And Flu Other (comments)    Pseudoephedrine-Dm Other (comments)    Ranitidine Hcl Itching and Other (comments)     Medication interaction      Aspirin Other (comments) and Palpitations        CURRENT MEDICATIONS:  A list of medications prior to the time of admission include:  Prior to Admission medications    Medication Sig Start Date End Date Taking? Authorizing Provider   oxybutynin chloride XL (DITROPAN XL) 10 mg CR tablet TAKE 1 TABLET BY MOUTH  DAILY 3/5/21  Yes Yao Krishnamurthy NP   estradioL (ESTRACE) 0.01 % (0.1 mg/gram) vaginal cream Pea sized amount to urethra 2 x/week 3/5/21  Yes Yao Krishnamurthy NP   melatonin 3 mg tablet Take 3 mg by mouth. 6/18/20  Yes Provider, Historical   gabapentin (NEURONTIN) 800 mg tablet Take 1 Tab by mouth three (3) times daily. Max Daily Amount: 2,400 mg. 2/28/20  Yes Jovanna Barnett MD   predniSONE (DELTASONE) 20 mg tablet Take 2 tab once daily for 4 days then 1 tab daily for 4 days 1/3/20  Yes Provider, Historical   HYDROcodone-acetaminophen (NORCO) 5-325 mg per tablet Take 1 Tab by mouth. 2/4/20  Yes Provider, Historical   albuterol (PROVENTIL HFA, VENTOLIN HFA, PROAIR HFA) 90 mcg/actuation inhaler INHALE 2 PUFFS PO Q 4 H 1/15/20  Yes Provider, Historical   vitamin E (AQUA GEMS) 400 unit capsule Take  by mouth daily. Yes Provider, Historical   ALBUTEROL IN Take 2 Puffs by inhalation.    Yes Provider, Historical   calcium carbonate (OS-EBONY) 500 mg calcium (1,250 mg) tablet Take 1,250 mg by mouth. 7/27/11  Yes Provider, Historical   cholecalciferol (VITAMIN D3) (2,000 UNITS /50 MCG) cap capsule Take 2,000 Units by mouth. 3/21/14  Yes Provider, Historical   cyclobenzaprine (FLEXERIL) 10 mg tablet Take 10 mg by mouth. 4/16/19  Yes Provider, Historical   loratadine (CLARITIN) 10 mg tablet Take 10 mg by mouth. 3/9/18  Yes Provider, Historical   aspirin delayed-release 81 mg tablet Take 81 mg by mouth. Yes Provider, Historical   amLODIPine-benazepril (LOTREL) 10-20 mg per capsule Take 1 Cap by mouth. Yes Provider, Historical   atorvastatin (LIPITOR) 20 mg tablet Take 20 mg by mouth. Yes Provider, Historical   cyanocobalamin (VITAMIN B12) 500 mcg tablet Take 500 mcg by mouth. Yes Provider, Historical   diclofenac EC (VOLTAREN) 75 mg EC tablet Take 75 mg by mouth. Yes Provider, Historical   fexofenadine (ALLEGRA) 180 mg tablet Take 180 mg by mouth. Yes Provider, Historical   furosemide (LASIX) 20 mg tablet Take 20 mg by mouth. 5/14/19  Yes Provider, Historical   hydroCHLOROthiazide (HYDRODIURIL) 25 mg tablet Take 25 mg by mouth. Yes Provider, Historical   ibuprofen (MOTRIN) 800 mg tablet Take 800 mg by mouth. 1/30/18  Yes Provider, Historical   levothyroxine (SYNTHROID) 50 mcg tablet Take 50 mcg by mouth. Yes Provider, Historical   meloxicam (MOBIC) 15 mg tablet Take 15 mg by mouth. Yes Provider, Historical   metoprolol tartrate (LOPRESSOR) 25 mg tablet Take 25 mg by mouth. Yes Provider, Historical   polyethylene glycol (MIRALAX) 17 gram/dose powder Take 17 g by mouth. Yes Provider, Historical   mometasone (NASONEX) 50 mcg/actuation nasal spray instill 2 sprays into each nostril once daily 1/30/18  Yes Provider, Historical   omeprazole (PRILOSEC) 20 mg capsule Take 20 mg by mouth. Yes Provider, Historical   pregabalin (LYRICA) 75 mg capsule Take 75 mg by mouth. Yes Provider, Historical   rOPINIRole (REQUIP) 0.5 mg tablet Take 0.5 mg by mouth.    Yes Provider, Historical   tiZANidine (ZANAFLEX) 4 mg tablet Take 2-4 mg by mouth. 5/18/18  Yes Provider, Historical   traMADol-acetaminophen (ULTRACET) 37.5-325 mg per tablet Take  by mouth. 4/16/18  Yes Provider, Historical   triamterene-hydroCHLOROthiazide (MAXZIDE) 37.5-25 mg per tablet Take  by mouth. 4/20/18  Yes Provider, Historical   rivaroxaban (XARELTO) 20 mg tab tablet Take 20 mg by mouth. 4/23/19   Provider, Historical       FAMILY HISTORY:   Family History   Problem Relation Age of Onset    Diabetes Mother     Hypertension Mother     Hypertension Father     Heart Failure Father     Breast Cancer Sister        SOCIAL HISTORY:   Social History     Socioeconomic History    Marital status: SINGLE     Spouse name: Not on file    Number of children: Not on file    Years of education: Not on file    Highest education level: Not on file   Tobacco Use    Smoking status: Never Smoker    Smokeless tobacco: Never Used   Substance and Sexual Activity    Alcohol use: Not Currently       ROS:No CP, No SOB, No fever/chills nor night sweats. No headaches, vision abnormalities to include double and oral loss of vision. No hearing abnormalities. Musculoskeletal pain per HPI. Pain is exacerbated positionally. Pt denies h/o spinal surgery, injections, or PT/chiropractor. Self treated with less than adequate relief on oral antiinflammatories. . Pt denies change in bowel or bladder habits. Pt denies fever, weight loss, or skin changes. EXAM:  Patient alert and oriented x 3,   CN II-XII grossly intact  Sitting comfortably in the exam room, interacting with conversation with pleasant affect. Breathing appears regular effortless with no visible usage of accessory muscles  Distal cap refill intact at 2/2 Samm UE / LE. Neuro intact Samm UE/LE to noxious stimuli    Ortho Specific exam:    Bilateral lower extremities reveal 3+ pitting edema. Exam difficult to assess for DVT secondary to excessive soft tissue bulk of the proximal calfs.   Patient has guarded range of motion of both knees secondary to excessive soft tissue bulk particularly in the flexion plane noting symmetrically only 85 degrees and is lacking 10 degrees to full extension symmetrically. No instability on varus or valgus stressing noted. Patella does track midline symmetrically with crepitation. ACL and PCL are intact with no laxity. X-ray: [facility] 3/23/2021 space 3 view of the bilateral knees reveals tricompartmental osteoarthritis with near complete collapse of the lateral joint spaces as well as but patellofemoral space. There is moderate narrowing of the medial joint space. No soft tissue calcifications identified. IMPRESSION:      ICD-10-CM ICD-9-CM    1. Acute pain of right knee  M25.561 719.46 AMB POC X-RAY KNEE 3 VIEW   2. Acute pain of left knee  M25.562 719.46 AMB POC X-RAY KNEE 3 VIEW      triamcinolone acetonide (KENALOG-40) 40 mg/mL injection 40 mg      DRAIN/INJECT LARGE JOINT/BURSA   3. Lymphedema of extremity  I89.0 457.1 REFERRAL TO PHYSICAL THERAPY   4. Arthritis of both knees  M17.0 716.96 triamcinolone acetonide (KENALOG-40) 40 mg/mL injection 40 mg   5. BMI 45.0-49.9, adult (HCC)  Z68.42 V85.42    6. Decreased range of motion (ROM) of both knees  M25.661 719.56     M25.662          PLAN: Today we discussed alternative care to include but not limited to conservative management of her lymphedema with outpatient lymphedema services. An order was written for [facility] and fitness through [employer] in motion for treatment. Further questioning indicates that Ms. Eli Quigley has had a cortisone injection to water both knees in the past however she cannot recall when exactly that was but feels confident it was more than 6 months ago. She has been seen through the emergency department at [facility] in the past as well as her internist however there is no documentation that could be associated with orthopedics outpatient in the chart.   She believes she has been seen through [facility] or [facility]. She did receive improvement in her left knee pain when she received her last cortisone shot. She has not had treatment with hyaluronic acid. X-rays today were reviewed copies provided all the patient's questions answered to her satisfaction. Today I am recommending a low-dose cortisone injection for her left knee symptoms. Additionally should she improve regarding the left knee we will plan on seeing her back in about 10 days for treatment of the right knee if necessary. Chart reviewed for the following:   Ludy Liz PA-C, have reviewed the History, Physical and updated the Allergic reactions for [place] [address] [address] performed immediately prior to start of procedure:   I, Kai Liz PA-C, have performed the following reviews on Hector Deal prior to the start of the procedure:            * Patient was identified by name and date of birth   * Agreement on procedure being performed was verified  * Risks and Benefits explained to the patient  * Procedure site verified and marked as necessary  * Patient was positioned for comfort  * Consent was signed and verified             Date of procedure: 03/23/21    Time: 9:58 AM    Procedure performed by:  Merline Perkins, PA-C    Provider assisted by: None     Patient assisted by: self    How tolerated by patient: tolerated the procedure well with no complications    Comments: none      Procedural: Using sterile technique after verbal and written consent were obtained appropriate timeout formed patient sitting on exam room table with knees flexed to 75 degrees using anterior medial interarticular approach the left knee was injected with 1 cc of Kenalog 40 mg/mL mixed with 7 mils of Sensorcaine 0.75%. There were no complications. Patient tolerated the procedure well. Patient provided a reminder for a \"due or due soon\" health maintenance.  I have asked the patient to schedule an appointment with their primary care provider for follow-up on general health maintenance concerns. Today all the patient's questions were answered to their satisfaction. Copies of x-rays reviewed if obtained this visit, and provided to patient. Dictation disclaimer:  Please note that this dictation was completed with [email or web address], the computer voice recognition software. Quite often unanticipated grammatical, syntax, homophones, and other interpretive errors are inadvertently transcribed by the computer software. Please disregard these errors. Please excuse any errors that have escaped final proofreading. Marci THOMAS, APC, MPAS, PA-C  [facility]

## 2021-04-20 ENCOUNTER — TELEPHONE (OUTPATIENT)
Dept: ORTHOPEDIC SURGERY | Age: 71
End: 2021-04-20

## 2021-04-20 NOTE — TELEPHONE ENCOUNTER
Patient called asking if we can send her xray reports from 03/23/21 to LIZBETH Turpin at [place] [id number]. She didn't have her fax number but this is her doctor and she says she's requesting the reports. Also, she says her pain has become unbearable and it keeps her from sleeping. She's asking if we can prescribe her some pain medication until she's seen on 04/28/21. She confirmed her pharmacy is [facility] on [address] and she's asking for a call back on her daughter Shilpa Dodson cell phone number [phone].

## 2021-04-21 NOTE — TELEPHONE ENCOUNTER
Please make patient aware that we do not have the ability to send directly to her internal medicine provider images. Patient can stop by and  a CD copy of her recent images date March 2021. Regarding pain medication for current symptoms pharmacy regulations/abortive medicine limit only over-the-counter oral analgesics or topical creams/ointments per 's recommended dosing.

## 2021-11-04 ENCOUNTER — IMPORTED ENCOUNTER (OUTPATIENT)
Dept: URBAN - METROPOLITAN AREA CLINIC 1 | Facility: CLINIC | Age: 71
End: 2021-11-04

## 2021-11-04 PROBLEM — H25.813: Noted: 2021-11-04

## 2021-11-04 PROBLEM — H16.143: Noted: 2021-11-04

## 2021-11-04 PROBLEM — H02.423: Noted: 2021-11-04

## 2021-11-04 PROBLEM — H04.123: Noted: 2021-11-04

## 2021-11-04 PROCEDURE — 99204 OFFICE O/P NEW MOD 45 MIN: CPT

## 2021-11-04 PROCEDURE — 92015 DETERMINE REFRACTIVE STATE: CPT

## 2021-11-04 NOTE — PATIENT DISCUSSION
1.  Cataract OU - Observe for now without intervention. The patient was advised to contact us if any change or worsening of vision. 2. CHRISTOFER w/ PEK OU - Recommend ATs QID OU. 3. Myogenic Ptosis OU - Will continue to observe at this time. Return for an appointment in 1 year for 30 with Dr. Kline.
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
## 2021-12-10 ENCOUNTER — OFFICE VISIT (OUTPATIENT)
Dept: ORTHOPEDIC SURGERY | Age: 71
End: 2021-12-10
Payer: MEDICARE

## 2021-12-10 VITALS
HEIGHT: 63 IN | BODY MASS INDEX: 48.9 KG/M2 | TEMPERATURE: 97.3 F | HEART RATE: 58 BPM | WEIGHT: 276 LBS | OXYGEN SATURATION: 100 %

## 2021-12-10 DIAGNOSIS — M47.816 LUMBAR SPONDYLOSIS: Primary | ICD-10-CM

## 2021-12-10 DIAGNOSIS — M54.16 LUMBAR NEURITIS: ICD-10-CM

## 2021-12-10 DIAGNOSIS — E66.01 OBESITY, MORBID (HCC): ICD-10-CM

## 2021-12-10 DIAGNOSIS — M43.10 SPONDYLOLISTHESIS, ACQUIRED: ICD-10-CM

## 2021-12-10 DIAGNOSIS — M51.36 DDD (DEGENERATIVE DISC DISEASE), LUMBAR: ICD-10-CM

## 2021-12-10 DIAGNOSIS — M48.062 SPINAL STENOSIS, LUMBAR REGION WITH NEUROGENIC CLAUDICATION: ICD-10-CM

## 2021-12-10 PROCEDURE — G8536 NO DOC ELDER MAL SCRN: HCPCS | Performed by: PHYSICAL MEDICINE & REHABILITATION

## 2021-12-10 PROCEDURE — 3017F COLORECTAL CA SCREEN DOC REV: CPT | Performed by: PHYSICAL MEDICINE & REHABILITATION

## 2021-12-10 PROCEDURE — 1090F PRES/ABSN URINE INCON ASSESS: CPT | Performed by: PHYSICAL MEDICINE & REHABILITATION

## 2021-12-10 PROCEDURE — 99214 OFFICE O/P EST MOD 30 MIN: CPT | Performed by: PHYSICAL MEDICINE & REHABILITATION

## 2021-12-10 PROCEDURE — 1101F PT FALLS ASSESS-DOCD LE1/YR: CPT | Performed by: PHYSICAL MEDICINE & REHABILITATION

## 2021-12-10 PROCEDURE — G8432 DEP SCR NOT DOC, RNG: HCPCS | Performed by: PHYSICAL MEDICINE & REHABILITATION

## 2021-12-10 PROCEDURE — G8417 CALC BMI ABV UP PARAM F/U: HCPCS | Performed by: PHYSICAL MEDICINE & REHABILITATION

## 2021-12-10 PROCEDURE — G8427 DOCREV CUR MEDS BY ELIG CLIN: HCPCS | Performed by: PHYSICAL MEDICINE & REHABILITATION

## 2021-12-10 PROCEDURE — 72100 X-RAY EXAM L-S SPINE 2/3 VWS: CPT | Performed by: PHYSICAL MEDICINE & REHABILITATION

## 2021-12-10 PROCEDURE — G8399 PT W/DXA RESULTS DOCUMENT: HCPCS | Performed by: PHYSICAL MEDICINE & REHABILITATION

## 2021-12-10 RX ORDER — SERTRALINE HYDROCHLORIDE 25 MG/1
1 TABLET, FILM COATED ORAL DAILY
COMMUNITY
Start: 2021-11-16

## 2021-12-10 RX ORDER — ERGOCALCIFEROL 1.25 MG/1
50000 CAPSULE ORAL
COMMUNITY
Start: 2021-10-21 | End: 2021-12-10 | Stop reason: SDUPTHER

## 2021-12-10 RX ORDER — METHYLPREDNISOLONE 4 MG/1
TABLET ORAL
Qty: 1 DOSE PACK | Refills: 0 | Status: SHIPPED | OUTPATIENT
Start: 2021-12-10 | End: 2022-04-21 | Stop reason: ALTCHOICE

## 2021-12-10 NOTE — LETTER
12/10/2021    Patient: Mihir Ham   YOB: 1950   Date of Visit: 12/10/2021     Marianela Connolly MD  [address] [address]  Via Fax: [phone]    Dear Marianela Connolly MD,      Thank you for referring Ms. Asim Rodriguez to [address] [place] [address] for evaluation. My notes for this consultation are attached. If you have questions, please do not hesitate to call me. I look forward to following your patient along with you.       Sincerely,    Aaron Busch MD

## 2021-12-10 NOTE — PROGRESS NOTES
Shante Mt presents today for   Chief Complaint   Patient presents with    Follow-up     back pain       Is someone accompanying this pt? no    Is the patient using any DME equipment during OV? Yes, cane    Depression Screening:  3 most recent PHQ Screens 3/23/2021   PHQ Not Done -   Little interest or pleasure in doing things Not at all   Feeling down, depressed, irritable, or hopeless Not at all   Total Score PHQ 2 0       Learning Assessment:  No flowsheet data found. Abuse Screening:  No flowsheet data found. Fall Risk  Fall Risk Assessment, last 12 mths 3/23/2021   Able to walk? Yes   Fall in past 12 months? 1   Do you feel unsteady? 1   Are you worried about falling 1   Is TUG test greater than 12 seconds? 0   Is the gait abnormal? 0   Number of falls in past 12 months 2   Fall with injury? 1       OPIOID RISK TOOL  No flowsheet data found. Coordination of Care:  1. Have you been to the ER, urgent care clinic since your last visit? Yes, October Leg pain  Hospitalized since your last visit? no    2. Have you seen or consulted any other health care providers outside of the [address] [place] [address] since your last visit? no Include any pap smears or colon screening.  no

## 2021-12-13 ENCOUNTER — TELEPHONE (OUTPATIENT)
Dept: ORTHOPEDIC SURGERY | Age: 71
End: 2021-12-13

## 2021-12-13 NOTE — TELEPHONE ENCOUNTER
MRI of the Lumbar Spine without contrast has been faxed to [facility] for scheduling, [phone], fax [phone]. NO [facility] pre-authorization is required. Case# [id number].

## 2021-12-17 DIAGNOSIS — M51.36 DDD (DEGENERATIVE DISC DISEASE), LUMBAR: ICD-10-CM

## 2021-12-17 DIAGNOSIS — M54.16 LUMBAR NEURITIS: ICD-10-CM

## 2021-12-17 DIAGNOSIS — M43.10 SPONDYLOLISTHESIS, ACQUIRED: ICD-10-CM

## 2021-12-17 DIAGNOSIS — M48.062 SPINAL STENOSIS, LUMBAR REGION WITH NEUROGENIC CLAUDICATION: ICD-10-CM

## 2021-12-17 DIAGNOSIS — M47.816 LUMBAR SPONDYLOSIS: ICD-10-CM

## 2022-01-03 ENCOUNTER — TELEPHONE (OUTPATIENT)
Dept: ORTHOPEDIC SURGERY | Age: 72
End: 2022-01-03

## 2022-01-03 NOTE — TELEPHONE ENCOUNTER
Patient called in requesting a refill of the medication methylPREDNISolone (MEDROL DOSEPACK) 4 mg tablet, called into the pharmacy on file. Patient stated she's still in pain.     Patient's contact is [phone]

## 2022-01-04 NOTE — TELEPHONE ENCOUNTER
Patient contacted and she states she contacted [facility] and they told her they would call her back to schedule and she has not heard anything else from them. I instructed her to call them again. I am also sending this to [facility] to look into.

## 2022-01-05 NOTE — TELEPHONE ENCOUNTER
Patient called again and said she would rather have her mri done at the end of Jan. That with all the virus and every thing out there she would rather wait. Patient is requesting to be set up to have the mri done at [facility] at the end of January. Patient [phone].

## 2022-01-10 NOTE — TELEPHONE ENCOUNTER
I attempted to contact Ms. Aleksandra Bon and reached unidentified voice mail. I left a generic message. MRI of the Lumbar Spine without contrast has been resubmitted to [facility] for scheduling. No [facility] pre-authorization is required. I requested she contact us to schedule a follow-up appointment with Dr. Rivero once her MRI is scheduled and to obtain a disk of the images to bring to the appointment.

## 2022-03-19 PROBLEM — E66.01 OBESITY, MORBID (HCC): Status: ACTIVE | Noted: 2019-12-19

## 2022-04-02 ASSESSMENT — VISUAL ACUITY
OD_GLARE: 20/50
OD_CC: 20/70
OS_CC: 20/50
OS_GLARE: 20/50

## 2022-04-02 ASSESSMENT — TONOMETRY
OD_IOP_MMHG: 9
OS_IOP_MMHG: 10

## 2022-04-21 ENCOUNTER — OFFICE VISIT (OUTPATIENT)
Dept: ORTHOPEDIC SURGERY | Age: 72
End: 2022-04-21
Payer: MEDICARE

## 2022-04-21 VITALS
BODY MASS INDEX: 45.54 KG/M2 | OXYGEN SATURATION: 100 % | TEMPERATURE: 97.3 F | WEIGHT: 257 LBS | HEART RATE: 57 BPM | HEIGHT: 63 IN

## 2022-04-21 DIAGNOSIS — M54.16 LUMBAR NEURITIS: ICD-10-CM

## 2022-04-21 DIAGNOSIS — M43.10 SPONDYLOLISTHESIS, ACQUIRED: ICD-10-CM

## 2022-04-21 DIAGNOSIS — E66.01 OBESITY, MORBID (HCC): ICD-10-CM

## 2022-04-21 DIAGNOSIS — M47.816 LUMBAR SPONDYLOSIS: Primary | ICD-10-CM

## 2022-04-21 DIAGNOSIS — M51.36 DDD (DEGENERATIVE DISC DISEASE), LUMBAR: ICD-10-CM

## 2022-04-21 DIAGNOSIS — M48.061 SPINAL STENOSIS OF LUMBAR REGION WITHOUT NEUROGENIC CLAUDICATION: ICD-10-CM

## 2022-04-21 PROCEDURE — G8417 CALC BMI ABV UP PARAM F/U: HCPCS | Performed by: PHYSICAL MEDICINE & REHABILITATION

## 2022-04-21 PROCEDURE — 3017F COLORECTAL CA SCREEN DOC REV: CPT | Performed by: PHYSICAL MEDICINE & REHABILITATION

## 2022-04-21 PROCEDURE — 99214 OFFICE O/P EST MOD 30 MIN: CPT | Performed by: PHYSICAL MEDICINE & REHABILITATION

## 2022-04-21 PROCEDURE — G8427 DOCREV CUR MEDS BY ELIG CLIN: HCPCS | Performed by: PHYSICAL MEDICINE & REHABILITATION

## 2022-04-21 PROCEDURE — G8536 NO DOC ELDER MAL SCRN: HCPCS | Performed by: PHYSICAL MEDICINE & REHABILITATION

## 2022-04-21 PROCEDURE — 1101F PT FALLS ASSESS-DOCD LE1/YR: CPT | Performed by: PHYSICAL MEDICINE & REHABILITATION

## 2022-04-21 PROCEDURE — G8432 DEP SCR NOT DOC, RNG: HCPCS | Performed by: PHYSICAL MEDICINE & REHABILITATION

## 2022-04-21 PROCEDURE — 1090F PRES/ABSN URINE INCON ASSESS: CPT | Performed by: PHYSICAL MEDICINE & REHABILITATION

## 2022-04-21 PROCEDURE — G8399 PT W/DXA RESULTS DOCUMENT: HCPCS | Performed by: PHYSICAL MEDICINE & REHABILITATION
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
## 2022-04-21 NOTE — LETTER
4/21/2022    Patient: Marin Santizo   YOB: 1950   Date of Visit: 4/21/2022     Ramiro Saenz MD  [address] [place] [address] [address] [address]  Via Fax: [phone]    Dear Ramior Saenz MD,      Thank you for referring Ms. Juana Pardo to [address] [place] [address] [facility] for evaluation. My notes for this consultation are attached. If you have questions, please do not hesitate to call me. I look forward to following your patient along with you.       Sincerely,    Anu Sheikh MD

## 2022-05-04 ENCOUNTER — TELEPHONE (OUTPATIENT)
Dept: ORTHOPEDIC SURGERY | Age: 72
End: 2022-05-04

## 2022-05-04 DIAGNOSIS — F41.8 TEST ANXIETY: Primary | ICD-10-CM

## 2022-05-04 RX ORDER — DIAZEPAM 10 MG/1
TABLET ORAL
Qty: 1 TABLET | Refills: 0 | Status: SHIPPED | OUTPATIENT
Start: 2022-05-04

## 2022-05-04 RX ORDER — DIAZEPAM 10 MG/1
TABLET ORAL
Qty: 1 TABLET | Refills: 0 | Status: CANCELLED | OUTPATIENT
Start: 2022-05-04

## 2022-05-05 ENCOUNTER — DOCUMENTATION ONLY (OUTPATIENT)
Dept: ORTHOPEDIC SURGERY | Age: 72
End: 2022-05-05

## 2022-05-05 NOTE — PROGRESS NOTES
Patient was set up for [facility] to have SNRB Bilat L4 on. But her insurance will only pay for it to be done at [place] [address].  I rescheduled for for 5-10-22 @ [place] [address]
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
## 2022-06-09 ENCOUNTER — OFFICE VISIT (OUTPATIENT)
Dept: ORTHOPEDIC SURGERY | Age: 72
End: 2022-06-09
Payer: MEDICARE

## 2022-06-09 VITALS
WEIGHT: 257 LBS | TEMPERATURE: 97.5 F | HEIGHT: 63 IN | BODY MASS INDEX: 45.54 KG/M2 | HEART RATE: 64 BPM | OXYGEN SATURATION: 99 %

## 2022-06-09 DIAGNOSIS — M47.816 LUMBAR SPONDYLOSIS: Primary | ICD-10-CM

## 2022-06-09 DIAGNOSIS — E66.01 OBESITY, MORBID (HCC): ICD-10-CM

## 2022-06-09 DIAGNOSIS — M54.16 LUMBAR NEURITIS: ICD-10-CM

## 2022-06-09 DIAGNOSIS — M51.36 DDD (DEGENERATIVE DISC DISEASE), LUMBAR: ICD-10-CM

## 2022-06-09 DIAGNOSIS — M43.10 SPONDYLOLISTHESIS, ACQUIRED: ICD-10-CM

## 2022-06-09 PROCEDURE — G8536 NO DOC ELDER MAL SCRN: HCPCS | Performed by: PHYSICAL MEDICINE & REHABILITATION

## 2022-06-09 PROCEDURE — G8399 PT W/DXA RESULTS DOCUMENT: HCPCS | Performed by: PHYSICAL MEDICINE & REHABILITATION

## 2022-06-09 PROCEDURE — 1090F PRES/ABSN URINE INCON ASSESS: CPT | Performed by: PHYSICAL MEDICINE & REHABILITATION

## 2022-06-09 PROCEDURE — G8432 DEP SCR NOT DOC, RNG: HCPCS | Performed by: PHYSICAL MEDICINE & REHABILITATION

## 2022-06-09 PROCEDURE — 3017F COLORECTAL CA SCREEN DOC REV: CPT | Performed by: PHYSICAL MEDICINE & REHABILITATION

## 2022-06-09 PROCEDURE — 1101F PT FALLS ASSESS-DOCD LE1/YR: CPT | Performed by: PHYSICAL MEDICINE & REHABILITATION

## 2022-06-09 PROCEDURE — G8427 DOCREV CUR MEDS BY ELIG CLIN: HCPCS | Performed by: PHYSICAL MEDICINE & REHABILITATION

## 2022-06-09 PROCEDURE — G8417 CALC BMI ABV UP PARAM F/U: HCPCS | Performed by: PHYSICAL MEDICINE & REHABILITATION

## 2022-06-09 PROCEDURE — 99213 OFFICE O/P EST LOW 20 MIN: CPT | Performed by: PHYSICAL MEDICINE & REHABILITATION

## 2022-06-09 PROCEDURE — 1123F ACP DISCUSS/DSCN MKR DOCD: CPT | Performed by: PHYSICAL MEDICINE & REHABILITATION

## 2022-06-09 NOTE — LETTER
6/9/2022    Patient: Mohit Simms   YOB: 1950   Date of Visit: 6/9/2022     Isabel Keith MD  [address] [place] [address] [phone]  Via Fax: [phone]    Dear Isabel Keith MD,      Thank you for referring Ms. Juan Callahan to [facility] for evaluation. My notes for this consultation are attached. If you have questions, please do not hesitate to call me. I look forward to following your patient along with you.       Sincerely,    Ayan Suero MD

## 2022-11-08 ENCOUNTER — TELEPHONE (OUTPATIENT)
Dept: ORTHOPEDIC SURGERY | Age: 72
End: 2022-11-08

## 2022-11-08 NOTE — TELEPHONE ENCOUNTER
Patient is requesting a refill of Gabapentin. Patient uses [facility] in [place] in [place]. Patient can be reached at [phone].

## 2022-11-09 NOTE — TELEPHONE ENCOUNTER
Patient called back and I told her she would have to be seen prior to restarting this medication. She was transferred to the  to schedule an appointment.

## 2022-11-09 NOTE — TELEPHONE ENCOUNTER
6-9-22 IMPRESSION AND PLAN:  Patient returns to the office today with c/o bilateral foot pain. Multiple treatment options were discussed. Pt noted resolution of back pain and BLE sxs with injection. She should f/u with orthopedics for her bilateral foot pain. Patient is neurologically intact. I will see the patient back prn.

## 2023-03-09 ENCOUNTER — OFFICE VISIT (OUTPATIENT)
Age: 73
End: 2023-03-09
Payer: MEDICARE

## 2023-03-09 VITALS
BODY MASS INDEX: 42.88 KG/M2 | HEIGHT: 63 IN | HEART RATE: 52 BPM | TEMPERATURE: 97.9 F | WEIGHT: 242 LBS | RESPIRATION RATE: 18 BRPM | OXYGEN SATURATION: 100 %

## 2023-03-09 DIAGNOSIS — M43.10 SPONDYLOLISTHESIS, ACQUIRED: ICD-10-CM

## 2023-03-09 DIAGNOSIS — M47.816 SPONDYLOSIS WITHOUT MYELOPATHY OR RADICULOPATHY, LUMBAR REGION: Primary | ICD-10-CM

## 2023-03-09 DIAGNOSIS — M51.36 DDD (DEGENERATIVE DISC DISEASE), LUMBAR: ICD-10-CM

## 2023-03-09 DIAGNOSIS — M54.16 LUMBAR NEURITIS: ICD-10-CM

## 2023-03-09 PROCEDURE — G8400 PT W/DXA NO RESULTS DOC: HCPCS | Performed by: PHYSICAL MEDICINE & REHABILITATION

## 2023-03-09 PROCEDURE — 1090F PRES/ABSN URINE INCON ASSESS: CPT | Performed by: PHYSICAL MEDICINE & REHABILITATION

## 2023-03-09 PROCEDURE — 1036F TOBACCO NON-USER: CPT | Performed by: PHYSICAL MEDICINE & REHABILITATION

## 2023-03-09 PROCEDURE — 3017F COLORECTAL CA SCREEN DOC REV: CPT | Performed by: PHYSICAL MEDICINE & REHABILITATION

## 2023-03-09 PROCEDURE — G8484 FLU IMMUNIZE NO ADMIN: HCPCS | Performed by: PHYSICAL MEDICINE & REHABILITATION

## 2023-03-09 PROCEDURE — 99214 OFFICE O/P EST MOD 30 MIN: CPT | Performed by: PHYSICAL MEDICINE & REHABILITATION

## 2023-03-09 PROCEDURE — G8417 CALC BMI ABV UP PARAM F/U: HCPCS | Performed by: PHYSICAL MEDICINE & REHABILITATION

## 2023-03-09 PROCEDURE — G8427 DOCREV CUR MEDS BY ELIG CLIN: HCPCS | Performed by: PHYSICAL MEDICINE & REHABILITATION

## 2023-03-09 PROCEDURE — 1123F ACP DISCUSS/DSCN MKR DOCD: CPT | Performed by: PHYSICAL MEDICINE & REHABILITATION

## 2023-03-09 RX ORDER — GABAPENTIN 100 MG/1
CAPSULE ORAL
COMMUNITY
Start: 2023-02-23

## 2023-03-09 RX ORDER — PREGABALIN 50 MG/1
50 CAPSULE ORAL 2 TIMES DAILY
Qty: 60 CAPSULE | Refills: 1 | Status: SHIPPED | OUTPATIENT
Start: 2023-03-09 | End: 2023-05-08

## 2023-03-09 ASSESSMENT — PATIENT HEALTH QUESTIONNAIRE - PHQ9
SUM OF ALL RESPONSES TO PHQ9 QUESTIONS 1 & 2: 0
1. LITTLE INTEREST OR PLEASURE IN DOING THINGS: 0
2. FEELING DOWN, DEPRESSED OR HOPELESS: 0
SUM OF ALL RESPONSES TO PHQ QUESTIONS 1-9: 0
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
## 2023-03-13 ENCOUNTER — OFFICE VISIT (OUTPATIENT)
Age: 73
End: 2023-03-13
Payer: MEDICARE

## 2023-03-13 VITALS — BODY MASS INDEX: 42.87 KG/M2 | HEIGHT: 63 IN

## 2023-03-13 DIAGNOSIS — M79.672 LEFT FOOT PAIN: Primary | ICD-10-CM

## 2023-03-13 DIAGNOSIS — M72.2 PLANTAR FASCIITIS OF LEFT FOOT: ICD-10-CM

## 2023-03-13 PROCEDURE — G8484 FLU IMMUNIZE NO ADMIN: HCPCS | Performed by: ORTHOPAEDIC SURGERY

## 2023-03-13 PROCEDURE — G8417 CALC BMI ABV UP PARAM F/U: HCPCS | Performed by: ORTHOPAEDIC SURGERY

## 2023-03-13 PROCEDURE — 73630 X-RAY EXAM OF FOOT: CPT | Performed by: ORTHOPAEDIC SURGERY

## 2023-03-13 PROCEDURE — 99203 OFFICE O/P NEW LOW 30 MIN: CPT | Performed by: ORTHOPAEDIC SURGERY

## 2023-03-13 PROCEDURE — 1090F PRES/ABSN URINE INCON ASSESS: CPT | Performed by: ORTHOPAEDIC SURGERY

## 2023-03-13 PROCEDURE — G8400 PT W/DXA NO RESULTS DOC: HCPCS | Performed by: ORTHOPAEDIC SURGERY

## 2023-03-13 PROCEDURE — 1123F ACP DISCUSS/DSCN MKR DOCD: CPT | Performed by: ORTHOPAEDIC SURGERY

## 2023-03-13 PROCEDURE — 1036F TOBACCO NON-USER: CPT | Performed by: ORTHOPAEDIC SURGERY

## 2023-03-13 PROCEDURE — 3017F COLORECTAL CA SCREEN DOC REV: CPT | Performed by: ORTHOPAEDIC SURGERY

## 2023-03-13 PROCEDURE — G8427 DOCREV CUR MEDS BY ELIG CLIN: HCPCS | Performed by: ORTHOPAEDIC SURGERY

## 2023-03-13 NOTE — PROGRESS NOTES
AMBULATORY PROGRESS NOTE      Patient: Lurline Frankel             MRN: [id number]     SSN: [id number] Body mass index is 42.87 kg/m². YOB: 1950     AGE: 74 y.o. EX: female    PCP: Grace Whitaker PA-C       IMPRESSION //  DIAGNOSIS AND TREATMENT PLAN      Lurline Frankel has a diagnosis of:      DIAGNOSES    1. Left foot pain    2. Plantar fasciitis of left foot            PLAN:    1. I obtained 3V XR in the office today. 2. DME: left visco heel was given and placed on the patient today    RTO 6 weeks    Orders Placed This Encounter    [39231] Foot Min 3V    DME Order for (Specify) as OP     DME: a left visco heel was given and placed on the patient today. 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 This will raise her arch, improve her mechanics, and balance her posterior tibial tendon and peroneal brevis tendons as these are antagonistic tendons. Isaiah Bonilla MD  3/13/2023 10:43 AM        Patient Instructions   [facility]  [address] [place] [address]   [phone]  DME: left visco heel       Please follow up with your PCP for any health maintenance as recommended. Lurline Frankel  expresses understanding of the diagnosis, treatment plan, and all of their proposed questions were answered to their satisfaction. Patient education has been provided re the diagnoses.          HPI // OBJECTIVE P.OKirby Epps IS A 74 y.o. female who is a/an  new patient, presenting to my outpatient office for evaluation of  the following chief complaint(s):     Chief Complaint   Patient presents with    Foot Pain     Left foot pain       Curtis Santos presents today with left foot pain. She states that she has spurs in her foot, which hinders her from walking. She reports that the condition has been ongoing for at least 3 months. She reports that she has mild startup pain and she has difficulty ascending and descending stairs. She reports that she has been recently diagnosed with lipidemia. Ht 5' 3\" (1.6 m)   BMI 42.87 kg/m²     Appearance: Alert, well appearing and pleasant patient who is in no distress, oriented to person, place/time, and who follows commands. Normal dress/motor activity/thought processes/memory. This patient is accompanied in the examination room by her daughter. Patient arrives to office via: with assistive device: single point cane. Footwear: athletic sneakers    Psychiatric:  Normal Affect/mood. Judgement, behavior, and conduct are appropriate. Speech normal in context and clarity, memory intact grossly, no involuntary movements - tremors. H EENT (2): Head normocephalic & atraumatic. Eye: pupils are round// EOM are intact // Neck: ROM WNL  // Hearings Intact  Respiratory: Breathing non labored     ANKLE/FOOT Left    Gait:  uses assistive device - single point cane  Tenderness: exquisitely tender to central heel tuberosity   Cutaneous: well healed surgical scar, partial amputation of #2 toe distal tib, swelling of posterior tib  Joint Motion: limited inversion,  WNL   Joint / Tendon Stability:  No Ankle or Subtalar instability or joint laxity.                        No peroneal sublux ability or dislocation  Alignment: neutral Hindfoot,    Neuro Motor/Sensory: NL/NL  Vascular: NL foot/ankle pulses,   Lymphatics: No extremity lymphedema, No calf swelling, no tenderness to calf muscles. RADIOGRAPHS// IMAGING//DIAGNOSTIC DATA     Orders Placed This Encounter   Procedures    [54144] Foot Min 3V    DME Order for (Selena Bunk) as OP     DME: a left visco heel was given and placed on the patient today. 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 This will raise her arch, improve her mechanics, and balance her posterior tibial tendon and peroneal brevis tendons as these are antagonistic tendons. Nishant Osborn MD  3/13/2023 10:43 AM            FOOT X RAYS 3 VIEWS LEFT  3/13/2023    NON WEIGHT BEARING  3 VIEWS :AP/LATERAL/OBLIQUE     X RAYS AT [address] [place] [address]  3/13/2023      Bones: No fractures or dislocations. No focal osteolytic or osteoblastic process     Bone Spurs: No significant bone spurs  Foot Alignment: WNL  Joint Condition: No Significant OA  Soft Tissues: Normal, No radiopaque foreign body and No abnormal calcific densities to soft tissues   No ankle joint effusion in lateral projection. Mineralization: Suggests Osteopenia    I have personally reviewed the results of the above study and the interpretation of this study is my professional opinion            CHART REVIEW     Lis Shea has been experiencing pain and discomfort confirmed as outlined in the pain assessment outlined below.  was reviewed by Charles Balderas. Minerva Robles MD  on 3/13/2023. No flowsheet data found.       PAST MEDICAL HISTORY:   Past Medical History:   Diagnosis Date    Arthritis     Asthma     Diverticulosis     Dry eyes     Dry mouth     High cholesterol     Hypertension     Morbid obesity (HCC)     Recurrent UTI     Right foot pain     Sleep apnea     Thyroid disease     Urinary incontinence      PAST SURGICAL HISTORY:   Past Surgical History:   Procedure Laterality Date    HYSTERECTOMY (CERVIX STATUS UNKNOWN)  1982    OTHER SURGICAL HISTORY      varicose vein treatment    OTHER SURGICAL HISTORY      ORIF Tibia/Fibia    OTHER SURGICAL HISTORY      gallbladder removal     ALLERGIES:   Allergies   Allergen Reactions    Latex Other (See Comments)    Penicillins Hives, Itching and Other (See Comments)     Other reaction(s): Unknown (comments)    Nickel Itching and Rash     Nickel dermatitis    Ranitidine Hcl Itching and Other (See Comments)     Medication interaction    Aspirin Other (See Comments) and Palpitations      FAMILY HISTORY:   Family History   Problem Relation Age of Onset    Breast Cancer Sister     Diabetes Mother     Hypertension Mother     Heart Failure Father     Hypertension Father      SOCIAL HISTORY:   Social History     Socioeconomic History    Marital status: Single     Spouse name: None    Number of children: None    Years of education: None    Highest education level: None   Tobacco Use    Smoking status: Never    Smokeless tobacco: Never   Substance and Sexual Activity    Alcohol use: Not Currently       CURRENT MEDICATIONS:  A list of medications prior to the time of admission include:  Prior to Admission medications    Medication Sig Start Date End Date Taking? Authorizing Provider   gabapentin (NEURONTIN) 100 MG capsule TAKE 1 CAPSULE BY MOUTH EVERY NIGHT FOR 3 DOSES THEN TAKE 1 CAPSULE BY MOUTH TWICE DAILY AS NEEDED FOR NERVE PAIN 2/23/23   Historical Provider, MD   pregabalin (LYRICA) 50 MG capsule Take 1 capsule by mouth 2 times daily for 60 days.  Max Daily Amount: 100 mg 3/9/23 5/8/23  Bib Larose MD ALBUTEROL IN Inhale 2 puffs into the lungs    Ar Automatic Reconciliation   albuterol sulfate HFA (PROVENTIL;VENTOLIN;PROAIR) 108 (90 Base) MCG/ACT inhaler INHALE 2 PUFFS PO Q 4 H 1/15/20   Ar Automatic Reconciliation   amLODIPine-benazepril (LOTREL) 10-20 MG per capsule Take 1 capsule by mouth    Ar Automatic Reconciliation   aspirin 81 MG EC tablet Take 81 mg by mouth daily    Ar Automatic Reconciliation   atorvastatin (LIPITOR) 20 MG tablet Take 20 mg by mouth daily    Ar Automatic Reconciliation   calcium carbonate (OYSTER SHELL CALCIUM 500 MG) 1250 (500 Ca) MG tablet Take 1,250 mg by mouth daily 7/27/11   Ar Automatic Reconciliation   Cholecalciferol 50 MCG (2000 UT) CAPS Take 2,000 Units by mouth daily  Patient not taking: Reported on 3/9/2023 3/21/14   Ar Automatic Reconciliation   cyanocobalamin 500 MCG tablet Take 500 mcg by mouth daily    Ar Automatic Reconciliation   cyclobenzaprine (FLEXERIL) 10 MG tablet Take 10 mg by mouth daily as needed  Patient not taking: Reported on 3/9/2023 4/16/19   Ar Automatic Reconciliation   diazePAM (VALIUM) 10 MG tablet Take as directed by procedure nurse. Will need ride home.   Patient not taking: Reported on 3/9/2023 5/4/22   Ar Automatic Reconciliation   diclofenac (VOLTAREN) 75 MG EC tablet Take 75 mg by mouth 2 times daily as needed  Patient not taking: Reported on 3/9/2023    Ar Automatic Reconciliation   ergocalciferol (ERGOCALCIFEROL) 1.25 MG (97463 UT) capsule Take 50,000 Units by mouth every 7 days  Patient not taking: Reported on 3/9/2023    Ar Automatic Reconciliation   estradiol (ESTRACE) 0.1 MG/GM vaginal cream Pea sized amount to urethra 2 x/week  Patient not taking: Reported on 3/9/2023 3/5/21   Ar Automatic Reconciliation   Fesoterodine Fumarate ER 8 MG TB24 Take 8 mg by mouth daily 2/8/22   Ar Automatic Reconciliation   fexofenadine (ALLEGRA) 180 MG tablet Take 180 mg by mouth daily as needed  Patient not taking: Reported on 3/9/2023    Ar Automatic Reconciliation   furosemide (LASIX) 20 MG tablet Take 20 mg by mouth daily as needed 5/14/19   Ar Automatic Reconciliation   gabapentin (NEURONTIN) 800 MG tablet Take 800 mg by mouth 3 times daily. Patient not taking: Reported on 3/9/2023 2/28/20   Ar Automatic Reconciliation   hydroCHLOROthiazide (HYDRODIURIL) 25 MG tablet Take 25 mg by mouth daily    Ar Automatic Reconciliation   ibuprofen (ADVIL;MOTRIN) 800 MG tablet Take 800 mg by mouth every 8 hours as needed 1/30/18   Ar Automatic Reconciliation   levothyroxine (SYNTHROID) 50 MCG tablet Take 50 mcg by mouth every morning (before breakfast)    Ar Automatic Reconciliation   loratadine (CLARITIN) 10 MG tablet Take 10 mg by mouth daily as needed 3/9/18   Ar Automatic Reconciliation   melatonin 3 MG TABS tablet Take 3 mg by mouth  Patient not taking: Reported on 3/9/2023 6/18/20   Ar Automatic Reconciliation   meloxicam (MOBIC) 15 MG tablet Take 15 mg by mouth daily as needed    Ar Automatic Reconciliation   metoprolol tartrate (LOPRESSOR) 25 MG tablet Take 25 mg by mouth daily    Ar Automatic Reconciliation   mometasone (NASONEX) 50 MCG/ACT nasal spray instill 2 sprays into each nostril once daily 1/30/18   Ar Automatic Reconciliation   omeprazole (PRILOSEC) 20 MG delayed release capsule Take 20 mg by mouth daily  Patient not taking: Reported on 3/9/2023    Ar Automatic Reconciliation   polyethylene glycol (GLYCOLAX) 17 GM/SCOOP powder Take 17 g by mouth daily as needed    Ar Automatic Reconciliation   pregabalin (LYRICA) 75 MG capsule Take 75 mg by mouth daily.   Patient not taking: Reported on 3/9/2023    Ar Automatic Reconciliation   rivaroxaban (XARELTO) 20 MG TABS tablet Take 20 mg by mouth daily (with breakfast) 4/23/19   Ar Automatic Reconciliation   rOPINIRole (REQUIP) 0.5 MG tablet Take 0.5 mg by mouth 2 times daily  Patient not taking: Reported on 3/9/2023    Ar Automatic Reconciliation   sertraline (ZOLOFT) 25 MG tablet Take 1 tablet by mouth daily  Patient not taking: Reported on 3/9/2023 11/16/21   Ar Automatic Reconciliation   tiZANidine (ZANAFLEX) 4 MG tablet Take 2-4 mg by mouth 3 times daily as needed  Patient not taking: Reported on 3/9/2023 5/18/18   Ar Automatic Reconciliation   traMADol-acetaminophen (ULTRACET) 37.5-325 MG per tablet Take 1 tablet by mouth daily as needed. Patient not taking: Reported on 3/9/2023 4/16/18   Ar Automatic Reconciliation   triamterene-hydroCHLOROthiazide (MAXZIDE-25) 37.5-25 MG per tablet Take 1 tablet by mouth daily 4/20/18   Ar Automatic Reconciliation       Current Outpatient Medications   Medication Sig    gabapentin (NEURONTIN) 100 MG capsule TAKE 1 CAPSULE BY MOUTH EVERY NIGHT FOR 3 DOSES THEN TAKE 1 CAPSULE BY MOUTH TWICE DAILY AS NEEDED FOR NERVE PAIN    pregabalin (LYRICA) 50 MG capsule Take 1 capsule by mouth 2 times daily for 60 days. Max Daily Amount: 100 mg    ALBUTEROL IN Inhale 2 puffs into the lungs    albuterol sulfate HFA (PROVENTIL;VENTOLIN;PROAIR) 108 (90 Base) MCG/ACT inhaler INHALE 2 PUFFS PO Q 4 H    amLODIPine-benazepril (LOTREL) 10-20 MG per capsule Take 1 capsule by mouth    aspirin 81 MG EC tablet Take 81 mg by mouth daily    atorvastatin (LIPITOR) 20 MG tablet Take 20 mg by mouth daily    calcium carbonate (OYSTER SHELL CALCIUM 500 MG) 1250 (500 Ca) MG tablet Take 1,250 mg by mouth daily    Cholecalciferol 50 MCG (2000 UT) CAPS Take 2,000 Units by mouth daily (Patient not taking: Reported on 3/9/2023)    cyanocobalamin 500 MCG tablet Take 500 mcg by mouth daily    cyclobenzaprine (FLEXERIL) 10 MG tablet Take 10 mg by mouth daily as needed (Patient not taking: Reported on 3/9/2023)    diazePAM (VALIUM) 10 MG tablet Take as directed by procedure nurse. Will need ride home.  (Patient not taking: Reported on 3/9/2023)    diclofenac (VOLTAREN) 75 MG EC tablet Take 75 mg by mouth 2 times daily as needed (Patient not taking: Reported on 3/9/2023)    ergocalciferol (ERGOCALCIFEROL) 1.25 MG (93191 UT) capsule Take 50,000 Units by mouth every 7 days (Patient not taking: Reported on 3/9/2023)    estradiol (ESTRACE) 0.1 MG/GM vaginal cream Pea sized amount to urethra 2 x/week (Patient not taking: Reported on 3/9/2023)    Fesoterodine Fumarate ER 8 MG TB24 Take 8 mg by mouth daily    fexofenadine (ALLEGRA) 180 MG tablet Take 180 mg by mouth daily as needed (Patient not taking: Reported on 3/9/2023)    furosemide (LASIX) 20 MG tablet Take 20 mg by mouth daily as needed    gabapentin (NEURONTIN) 800 MG tablet Take 800 mg by mouth 3 times daily. (Patient not taking: Reported on 3/9/2023)    hydroCHLOROthiazide (HYDRODIURIL) 25 MG tablet Take 25 mg by mouth daily    ibuprofen (ADVIL;MOTRIN) 800 MG tablet Take 800 mg by mouth every 8 hours as needed    levothyroxine (SYNTHROID) 50 MCG tablet Take 50 mcg by mouth every morning (before breakfast)    loratadine (CLARITIN) 10 MG tablet Take 10 mg by mouth daily as needed    melatonin 3 MG TABS tablet Take 3 mg by mouth (Patient not taking: Reported on 3/9/2023)    meloxicam (MOBIC) 15 MG tablet Take 15 mg by mouth daily as needed    metoprolol tartrate (LOPRESSOR) 25 MG tablet Take 25 mg by mouth daily    mometasone (NASONEX) 50 MCG/ACT nasal spray instill 2 sprays into each nostril once daily    omeprazole (PRILOSEC) 20 MG delayed release capsule Take 20 mg by mouth daily (Patient not taking: Reported on 3/9/2023)    polyethylene glycol (GLYCOLAX) 17 GM/SCOOP powder Take 17 g by mouth daily as needed    pregabalin (LYRICA) 75 MG capsule Take 75 mg by mouth daily.  (Patient not taking: Reported on 3/9/2023)    rivaroxaban (XARELTO) 20 MG TABS tablet Take 20 mg by mouth daily (with breakfast)    rOPINIRole (REQUIP) 0.5 MG tablet Take 0.5 mg by mouth 2 times daily (Patient not taking: Reported on 3/9/2023)    sertraline (ZOLOFT) 25 MG tablet Take 1 tablet by mouth daily (Patient not taking: Reported on 3/9/2023)    tiZANidine (ZANAFLEX) 4 MG tablet Take 2-4 mg by mouth 3 times daily as needed (Patient not taking: Reported on 3/9/2023)    traMADol-acetaminophen (ULTRACET) 37.5-325 MG per tablet Take 1 tablet by mouth daily as needed. (Patient not taking: Reported on 3/9/2023)    triamterene-hydroCHLOROthiazide (MAXZIDE-25) 37.5-25 MG per tablet Take 1 tablet by mouth daily     No current facility-administered medications for this visit. DIAGNOSTIC LAB DATA      No results found for this or any previous visit from the past 3650 days. No results found for this or any previous visit (from the past 4464 hour(s)). No results found for: WBC, HGB, HCT, MCV, PLT, LABLYMP, MID, GRAN, LYMPHOPCT, MIDPERCENT, GRANULOCYTES, RBC, MCH, MCHC, RDW  No results found for: LABA1C,  No results found for: NA, K, CL, CO2, BUN, CREATININE, GLUCOSE, CALCIUM, PROT, LABALBU, BILITOT, ALKPHOS, AST, ALT, LABGLOM, GFRAA, AGRATIO, GLOB  No results found for: VITD25   No results found for: INR, PROTIME  No results found for: APTT       REVIEW OF SYSTEMS : 3/13/2023  ALL BELOW ARE Negative except : SEE HPI     All other systems reviewed and are negative. 14 point review of systems otherwise negative unless noted in HPI. I have spent 30 minutes reviewing the previous notes, reviewing diagnostic studies [Advanced  Imaging, Diagnostic test results (x-rays)] and had a direct face to face with the patient discussing the diagnosis and importance of compliance with the treatment and plan. The treatment plan is listed in the above plan section of this Office encounter. I  answered all of her questions, as well as documenting patient care coordination for this individual on the day of the visit. Disclaimer:     Sections of this note are dictated using utilizing voice recognition software, which may have resulted in some phonetic based errors in grammar and contents.  Even though attempts were made to correct all the mistakes, some may have been missed, and remained in the body of the document. If questions arise, please contact our department. An electronic signature was used to authenticate this note. Lissy Posadas may have a reminder for a \"due or due soon\" health maintenance. I have asked that she contact her primary care provider for follow-up on this health maintenance.            Scribed by Bud Barbosa ([facility]), as dictated by Radha Goldberg MD   3/13/2023  10:55 AM

## 2023-03-13 NOTE — PATIENT INSTRUCTIONS
[facility]  [address] [place] [address], [facility] [address]  [phone]  DME: left visco heel

## 2023-06-06 DIAGNOSIS — M47.816 SPONDYLOSIS WITHOUT MYELOPATHY OR RADICULOPATHY, LUMBAR REGION: ICD-10-CM

## 2023-06-06 DIAGNOSIS — M51.36 DDD (DEGENERATIVE DISC DISEASE), LUMBAR: ICD-10-CM

## 2023-06-06 DIAGNOSIS — M43.10 SPONDYLOLISTHESIS, ACQUIRED: ICD-10-CM

## 2023-06-06 DIAGNOSIS — M54.16 LUMBAR NEURITIS: ICD-10-CM

## 2023-06-06 RX ORDER — PREGABALIN 50 MG/1
CAPSULE ORAL
Qty: 60 CAPSULE | OUTPATIENT
Start: 2023-06-06

## 2023-06-07 NOTE — TELEPHONE ENCOUNTER
Attempted to contact pt regarding refill request. Refill denied pt needs a follow up appointment. Contacted all phone numbers listed for patient and was told I had the wrong number on two of those phone numbers.  Also, the phone number beginning in [phone] is not in service

## 2023-06-14 DIAGNOSIS — M51.36 DDD (DEGENERATIVE DISC DISEASE), LUMBAR: ICD-10-CM

## 2023-06-14 DIAGNOSIS — M47.816 SPONDYLOSIS WITHOUT MYELOPATHY OR RADICULOPATHY, LUMBAR REGION: ICD-10-CM

## 2023-06-14 DIAGNOSIS — M54.16 LUMBAR NEURITIS: ICD-10-CM

## 2023-06-14 DIAGNOSIS — M43.10 SPONDYLOLISTHESIS, ACQUIRED: ICD-10-CM

## 2023-06-14 RX ORDER — PREGABALIN 50 MG/1
CAPSULE ORAL
Qty: 60 CAPSULE | OUTPATIENT
Start: 2023-06-14

## 2023-06-27 ENCOUNTER — TELEMEDICINE (OUTPATIENT)
Age: 73
End: 2023-06-27
Payer: MEDICARE

## 2023-06-27 DIAGNOSIS — M51.36 DDD (DEGENERATIVE DISC DISEASE), LUMBAR: ICD-10-CM

## 2023-06-27 DIAGNOSIS — M54.16 LUMBAR NEURITIS: ICD-10-CM

## 2023-06-27 DIAGNOSIS — M47.816 SPONDYLOSIS WITHOUT MYELOPATHY OR RADICULOPATHY, LUMBAR REGION: Primary | ICD-10-CM

## 2023-06-27 DIAGNOSIS — M43.10 SPONDYLOLISTHESIS, ACQUIRED: ICD-10-CM

## 2023-06-27 PROCEDURE — 99443 PR PHYS/QHP TELEPHONE EVALUATION 21-30 MIN: CPT | Performed by: NURSE PRACTITIONER

## 2023-06-27 RX ORDER — PREGABALIN 75 MG/1
75 CAPSULE ORAL 2 TIMES DAILY
Qty: 60 CAPSULE | Refills: 1 | Status: SHIPPED | OUTPATIENT
Start: 2023-06-27 | End: 2023-08-26

## 2023-06-27 RX ORDER — NAPROXEN 500 MG/1
500 TABLET ORAL 2 TIMES DAILY
COMMUNITY
Start: 2023-04-06

## 2023-06-27 RX ORDER — MELOXICAM 7.5 MG/1
TABLET ORAL
COMMUNITY
Start: 2023-06-17

## 2023-07-17 ENCOUNTER — TELEPHONE (OUTPATIENT)
Age: 73
End: 2023-07-17

## 2023-07-17 DIAGNOSIS — M47.816 SPONDYLOSIS WITHOUT MYELOPATHY OR RADICULOPATHY, LUMBAR REGION: ICD-10-CM

## 2023-07-17 DIAGNOSIS — M43.10 SPONDYLOLISTHESIS, ACQUIRED: ICD-10-CM

## 2023-07-17 DIAGNOSIS — M54.16 LUMBAR NEURITIS: ICD-10-CM

## 2023-07-17 DIAGNOSIS — M51.36 DDD (DEGENERATIVE DISC DISEASE), LUMBAR: ICD-10-CM

## 2023-07-17 NOTE — TELEPHONE ENCOUNTER
Please advise. Pt had a phone visit with Giuliano Cortés on 6/27 and was increased from lyrica 50mg to lyrica 75 mg.

## 2023-07-17 NOTE — TELEPHONE ENCOUNTER
Patient's daughter, Arnold Aguilera, called stating the pt was prescribed Lyrica 75 mg instead of 50 mg, and she states it is too strong for her. She would like to know if she can go back to 50 mg. Please advise pt's daughter at [phone].

## 2023-07-18 NOTE — TELEPHONE ENCOUNTER
July 17, 2023  Rosette Branch MD  to Me    NP     9:02 PM  She was most recently seen by Tejinder Sepulveda who increased her Lyrica. Unfortunately I'm unable to tell from [address] [place] [address] not what the patient's pain level was last visit, but assume it was bad enough that she wanted to increase the medicine. She can go back to 50mg po bid or if that wasn't strong enough, but 75mg was too strong, we could try a different medication to see if that would be better tolerated. Patients daughter Arnold Aguilera is not listed on perm to release filled out 03/2023. She provided me with the phone number [phone] for her but this patient did not answer. The  is also not set up. I will try to call the patient again.

## 2023-07-18 NOTE — TELEPHONE ENCOUNTER
Pain is a 7 during our phone call on 7/18. Patient did inform me that she would like to stay on lyrica and just decrease to 50 mg bid at this time. Informed patient that Dr. April Rodriguez is okay with the decrease. Patient verbalized understanding , no further action needed.

## 2023-07-21 RX ORDER — PREGABALIN 50 MG/1
50 CAPSULE ORAL 2 TIMES DAILY
Qty: 60 CAPSULE | Refills: 1 | Status: SHIPPED | OUTPATIENT
Start: 2023-07-21 | End: 2023-09-19

## 2023-07-21 NOTE — TELEPHONE ENCOUNTER
Patients daughter, Jyoti Lomas, called to report the the decreased dosage of Lyrica has not been called in to the pharmacy. Please call in for patient to fill.

## 2023-12-29 PROBLEM — R32 URINARY INCONTINENCE: Status: ACTIVE | Noted: 2023-12-29

## 2023-12-29 PROBLEM — I10 HYPERTENSION: Status: ACTIVE | Noted: 2023-12-29

## 2023-12-29 PROBLEM — G47.30 SLEEP APNEA: Status: ACTIVE | Noted: 2023-12-29

## 2023-12-29 PROBLEM — N39.0 RECURRENT UTI: Status: ACTIVE | Noted: 2023-12-29

## 2024-02-02 ENCOUNTER — OFFICE VISIT (OUTPATIENT)
Age: 74
End: 2024-02-02
Payer: MEDICARE

## 2024-02-02 VITALS
BODY MASS INDEX: 40.63 KG/M2 | OXYGEN SATURATION: 95 % | TEMPERATURE: 97.4 F | WEIGHT: 238 LBS | HEIGHT: 64 IN | HEART RATE: 50 BPM

## 2024-02-02 DIAGNOSIS — M48.061 SPINAL STENOSIS, LUMBAR REGION WITHOUT NEUROGENIC CLAUDICATION: ICD-10-CM

## 2024-02-02 DIAGNOSIS — M54.16 LUMBAR NEURITIS: ICD-10-CM

## 2024-02-02 DIAGNOSIS — M51.36 DDD (DEGENERATIVE DISC DISEASE), LUMBAR: ICD-10-CM

## 2024-02-02 DIAGNOSIS — M43.10 SPONDYLOLISTHESIS, ACQUIRED: ICD-10-CM

## 2024-02-02 DIAGNOSIS — M47.816 SPONDYLOSIS WITHOUT MYELOPATHY OR RADICULOPATHY, LUMBAR REGION: Primary | ICD-10-CM

## 2024-02-02 PROCEDURE — G8417 CALC BMI ABV UP PARAM F/U: HCPCS | Performed by: PHYSICAL MEDICINE & REHABILITATION

## 2024-02-02 PROCEDURE — G8400 PT W/DXA NO RESULTS DOC: HCPCS | Performed by: PHYSICAL MEDICINE & REHABILITATION

## 2024-02-02 PROCEDURE — G8427 DOCREV CUR MEDS BY ELIG CLIN: HCPCS | Performed by: PHYSICAL MEDICINE & REHABILITATION

## 2024-02-02 PROCEDURE — 1036F TOBACCO NON-USER: CPT | Performed by: PHYSICAL MEDICINE & REHABILITATION

## 2024-02-02 PROCEDURE — 1123F ACP DISCUSS/DSCN MKR DOCD: CPT | Performed by: PHYSICAL MEDICINE & REHABILITATION

## 2024-02-02 PROCEDURE — G8484 FLU IMMUNIZE NO ADMIN: HCPCS | Performed by: PHYSICAL MEDICINE & REHABILITATION

## 2024-02-02 PROCEDURE — 99214 OFFICE O/P EST MOD 30 MIN: CPT | Performed by: PHYSICAL MEDICINE & REHABILITATION

## 2024-02-02 PROCEDURE — 3017F COLORECTAL CA SCREEN DOC REV: CPT | Performed by: PHYSICAL MEDICINE & REHABILITATION

## 2024-02-02 PROCEDURE — 1090F PRES/ABSN URINE INCON ASSESS: CPT | Performed by: PHYSICAL MEDICINE & REHABILITATION

## 2024-02-02 RX ORDER — TIAGABINE HYDROCHLORIDE 2 MG/1
2 TABLET, FILM COATED ORAL 2 TIMES DAILY
Qty: 60 TABLET | Refills: 1 | Status: SHIPPED | OUTPATIENT
Start: 2024-02-02

## 2024-02-02 RX ORDER — TIAGABINE HYDROCHLORIDE 4 MG/1
4 TABLET, FILM COATED ORAL 2 TIMES DAILY
Qty: 60 TABLET | Refills: 1 | Status: CANCELLED | OUTPATIENT
Start: 2024-02-02

## 2024-02-02 RX ORDER — OXYBUTYNIN CHLORIDE 10 MG/1
10 TABLET, EXTENDED RELEASE ORAL DAILY
COMMUNITY
Start: 2023-12-29

## 2024-02-02 RX ORDER — VARENICLINE 0.03 MG/.05ML
8.4 SPRAY NASAL DAILY
COMMUNITY
Start: 2024-01-09

## 2024-02-02 NOTE — PROGRESS NOTES
Written by Sandeep Trevizo, as dictated by Aaron Hendrix MD  I examined the patient, reviewed and agree with the note.

## 2024-02-15 ENCOUNTER — TELEPHONE (OUTPATIENT)
Age: 74
End: 2024-02-15

## 2024-02-15 DIAGNOSIS — M48.061 SPINAL STENOSIS, LUMBAR REGION WITHOUT NEUROGENIC CLAUDICATION: ICD-10-CM

## 2024-02-15 DIAGNOSIS — M47.816 SPONDYLOSIS WITHOUT MYELOPATHY OR RADICULOPATHY, LUMBAR REGION: Primary | ICD-10-CM

## 2024-02-15 DIAGNOSIS — M43.10 SPONDYLOLISTHESIS, ACQUIRED: ICD-10-CM

## 2024-02-15 DIAGNOSIS — M51.36 DDD (DEGENERATIVE DISC DISEASE), LUMBAR: ICD-10-CM

## 2024-02-15 DIAGNOSIS — M54.16 LUMBAR NEURITIS: ICD-10-CM

## 2024-02-15 RX ORDER — PREGABALIN 50 MG/1
50 CAPSULE ORAL 2 TIMES DAILY
Qty: 60 CAPSULE | Refills: 1 | Status: SHIPPED | OUTPATIENT
Start: 2024-02-15 | End: 2024-04-15

## 2024-02-15 NOTE — TELEPHONE ENCOUNTER
Patient called today requesting to have a refill of pregabalin (LYRICA) 50 MG capsule sent to   [id number] - [place], [place] - [address] - P [phone] - F [phone] . She states she is unhappy with the side effects of tiaGABine (GABITRIL) 2 MG tablet that she was prescribed at the last visit and would prefer to switch back to Lyrica as was discussed at last visit.    Please review and advise patient, tel. [phone].

## 2024-02-15 NOTE — TELEPHONE ENCOUNTER
IMPRESSION AND PLAN:  Patient returns to the office today with c/o  left foot pain and LLE pain in a L4 distribution to the foot x 1 year. Multiple treatment options were discussed. I discussed changing her neuropathic medication, pt wished to proceed. I will try her on Gabitril 2 mg BID. The risks, benefits, and potential side effects of this medication were discussed. She understands and wishes to proceed. I advised her to continue taking the medication even if they do not see any relief. I told her to call the office if intolerant to the new medication. If she is intolerant to the the Gabatril, she may return to the Lyrica 50 mg BID. Consideration was given to starting her on Topamax, but deferred secondary to glaucoma. Also considered starting her on Cymbalta, but deferred secondary to being on Asprin with increased risk of bleeding. Considered scheduling pt for a left sided L4 SNRB, but was deferred at this time due to transportation issues. Recommended she return The patient is Neurologically intact. I will see the patient back in 6 weeks or earlier if needed.

## 2024-03-12 ENCOUNTER — TELEPHONE (OUTPATIENT)
Age: 74
End: 2024-03-12

## 2024-03-12 NOTE — TELEPHONE ENCOUNTER
Patient's daughter Tawana called requesting a mri referral to [facility] in [place], [place] because it's closer to her home and she will be talking her.     The patient has an upcoming appt on 03/26/2024 and didn't know if she had to wait until then to request the mri.    Please advise if the patient is able to have the mri done at [facility].    Patient's daughter can be reached at [phone].

## 2024-03-12 NOTE — TELEPHONE ENCOUNTER
2-2-24  IMPRESSION AND PLAN:  Patient returns to the office today with c/o  left foot pain and LLE pain in a L4 distribution to the foot x 1 year. Multiple treatment options were discussed. I discussed changing her neuropathic medication, pt wished to proceed. I will try her on Gabitril 2 mg BID. The risks, benefits, and potential side effects of this medication were discussed. She understands and wishes to proceed. I advised her to continue taking the medication even if they do not see any relief. I told her to call the office if intolerant to the new medication. If she is intolerant to the the Gabatril, she may return to the Lyrica 50 mg BID. Consideration was given to starting her on Topamax, but deferred secondary to glaucoma. Also considered starting her on Cymbalta, but deferred secondary to being on Asprin with increased risk of bleeding. Considered scheduling pt for a left sided L4 SNRB, but was deferred at this time due to transportation issues. Recommended she return The patient is Neurologically intact. I will see the patient back in 6 weeks or earlier if needed.          Last MRI 4-14-22

## 2024-03-13 NOTE — TELEPHONE ENCOUNTER
Patient contacted and told that we cannot talk to her daughter because she is not on the HIPAA. I told her that Dr Hendrix does not want to order an MRI until he sees her.

## 2024-04-16 ENCOUNTER — OFFICE VISIT (OUTPATIENT)
Age: 74
End: 2024-04-16
Payer: MEDICARE

## 2024-04-16 VITALS
HEIGHT: 64 IN | TEMPERATURE: 98 F | OXYGEN SATURATION: 96 % | HEART RATE: 53 BPM | BODY MASS INDEX: 39.95 KG/M2 | WEIGHT: 234 LBS

## 2024-04-16 DIAGNOSIS — M51.36 DDD (DEGENERATIVE DISC DISEASE), LUMBAR: ICD-10-CM

## 2024-04-16 DIAGNOSIS — M54.16 LUMBAR NEURITIS: ICD-10-CM

## 2024-04-16 DIAGNOSIS — M43.10 SPONDYLOLISTHESIS, ACQUIRED: ICD-10-CM

## 2024-04-16 DIAGNOSIS — M48.061 SPINAL STENOSIS, LUMBAR REGION WITHOUT NEUROGENIC CLAUDICATION: ICD-10-CM

## 2024-04-16 DIAGNOSIS — M47.816 SPONDYLOSIS WITHOUT MYELOPATHY OR RADICULOPATHY, LUMBAR REGION: Primary | ICD-10-CM

## 2024-04-16 PROCEDURE — 1036F TOBACCO NON-USER: CPT | Performed by: PHYSICAL MEDICINE & REHABILITATION

## 2024-04-16 PROCEDURE — 1090F PRES/ABSN URINE INCON ASSESS: CPT | Performed by: PHYSICAL MEDICINE & REHABILITATION

## 2024-04-16 PROCEDURE — 1123F ACP DISCUSS/DSCN MKR DOCD: CPT | Performed by: PHYSICAL MEDICINE & REHABILITATION

## 2024-04-16 PROCEDURE — G8427 DOCREV CUR MEDS BY ELIG CLIN: HCPCS | Performed by: PHYSICAL MEDICINE & REHABILITATION

## 2024-04-16 PROCEDURE — G8417 CALC BMI ABV UP PARAM F/U: HCPCS | Performed by: PHYSICAL MEDICINE & REHABILITATION

## 2024-04-16 PROCEDURE — G8400 PT W/DXA NO RESULTS DOC: HCPCS | Performed by: PHYSICAL MEDICINE & REHABILITATION

## 2024-04-16 PROCEDURE — 3017F COLORECTAL CA SCREEN DOC REV: CPT | Performed by: PHYSICAL MEDICINE & REHABILITATION

## 2024-04-16 PROCEDURE — 99214 OFFICE O/P EST MOD 30 MIN: CPT | Performed by: PHYSICAL MEDICINE & REHABILITATION

## 2024-04-16 RX ORDER — TIAGABINE HYDROCHLORIDE 4 MG/1
4 TABLET, FILM COATED ORAL 2 TIMES DAILY
Qty: 60 TABLET | Refills: 1 | Status: SHIPPED | OUTPATIENT
Start: 2024-04-16

## 2024-04-16 NOTE — PROGRESS NOTES
daily    Spinal stenosis, lumbar region without neurogenic claudication  -     Ambulatory Referral to Ortho Injection  -     tiaGABine (GABITRIL) 4 MG tablet; Take 1 tablet by mouth 2 times daily          IMPRESSION AND PLAN:  Patient returns to the office today with c/o left foot pain and LLE pain in a L4 distribution to the foot. Multiple treatment options were discussed. Patient is not interested in surgery at this time. Patient elected to proceed with blocks. I will order left L4 and L5 SNRBs pending clearance from Dr. Ross to pause Asprin. I will increase her Gabitril 2 mg BID to 4 mg BID. She was advised to call the office if intolerant to higher dose.  The patient is Neurologically intact. I will see the patient back after the Block or earlier if needed.     Written by Sandeep Trevizo, as dictated by Aaron Hendrix MD  I examined the patient, reviewed and agree with the note.

## 2024-05-06 ENCOUNTER — TELEPHONE (OUTPATIENT)
Age: 74
End: 2024-05-06

## 2024-05-06 NOTE — TELEPHONE ENCOUNTER
PTS DAUGHTER LY CALLED AND STATED THAT THEY WOULD NEED TO R/S THE BLOCK SCHEDULED FOR 5/14 BECAUSE HER CARDIOLOGIST WANTS TO SEE HER BEFORE HE WILL CLEAR HER . WILL R/S HER ONCE SHE HAS BEEN CLEARED / PT ALSO AWARE THAT SHE WILL NEED A PHONE VISIT WITH DR WARE BECAUSE IT HAS TO BE DONE WITHIN 30 DAYS OF THE BLOCK

## 2024-06-03 ENCOUNTER — TELEPHONE (OUTPATIENT)
Age: 74
End: 2024-06-03

## 2024-06-03 NOTE — TELEPHONE ENCOUNTER
Patients daughter Tawana is requesting to schedule an appt for the patient for sometime next week prior to having her block injection. Tawana is asking for a call back, and can be reached at [phone].

## 2024-06-07 ENCOUNTER — TELEPHONE (OUTPATIENT)
Age: 74
End: 2024-06-07

## 2024-06-07 NOTE — TELEPHONE ENCOUNTER
VIANCA telling dimitry to call me back and that I will be leaving by 2:45 today. I am at ext [phone] today.

## 2024-06-07 NOTE — TELEPHONE ENCOUNTER
Bailey, pts daughter called and needed clarification on the Lyrica and pregabalin. Pls call her back to clarify [phone]

## 2024-06-07 NOTE — TELEPHONE ENCOUNTER
Bailey called back and just wanted to make sure that her mother was not supposed to be taking lyrica as well as Gabitril because she took her to an appt today and both were on the list. I told her no we weaned her off off lyrica July 2023 and started her on gabitril.

## 2024-06-21 ENCOUNTER — TELEMEDICINE (OUTPATIENT)
Age: 74
End: 2024-06-21

## 2024-06-21 DIAGNOSIS — M54.16 LUMBAR NEURITIS: ICD-10-CM

## 2024-06-21 DIAGNOSIS — M48.061 SPINAL STENOSIS, LUMBAR REGION WITHOUT NEUROGENIC CLAUDICATION: ICD-10-CM

## 2024-06-21 DIAGNOSIS — M47.816 SPONDYLOSIS WITHOUT MYELOPATHY OR RADICULOPATHY, LUMBAR REGION: Primary | ICD-10-CM

## 2024-06-21 DIAGNOSIS — M43.10 SPONDYLOLISTHESIS, ACQUIRED: ICD-10-CM

## 2024-06-21 DIAGNOSIS — M51.36 DDD (DEGENERATIVE DISC DISEASE), LUMBAR: ICD-10-CM

## 2024-06-21 NOTE — PROGRESS NOTES
pregabalin (LYRICA) 50 MG capsule Take 1 capsule by mouth 2 times daily for 60 days. Max Daily Amount: 100 mg 60 capsule 1    pregabalin (LYRICA) 50 MG capsule Take 1 capsule by mouth 2 times daily for 60 days. Max Daily Amount: 100 mg 60 capsule 1     No current facility-administered medications for this visit.       Allergies   Allergen Reactions    Latex Other (See Comments)    Penicillins Hives, Itching and Other (See Comments)     Other reaction(s): Unknown (comments)    Nickel Itching and Rash     Nickel dermatitis    Ranitidine Hcl Itching and Other (See Comments)     Medication interaction          PHYSICAL EXAMINATION  Deferred.    CONSTITUTIONAL: NAD, A&O x 3    ASSESSMENT   Ximena was seen today for back pain.    Diagnoses and all orders for this visit:    Spondylosis without myelopathy or radiculopathy, lumbar region    Lumbar neuritis    DDD (degenerative disc disease), lumbar    Spondylolisthesis, acquired    Spinal stenosis, lumbar region without neurogenic claudication        IMPRESSION AND PLAN:  The patient consented to the tele health visit and was aware that there would be a charge. During today's TeleVisit patient had c/o left foot pain and LLE pain in a L4 distribution to the foot. Multiple treatment options were discussed. She elected to proceed with blocks. I previously ordered left L4 and L5 SNRBs, and she will contact office to schedule it. I discussed increasing her neuropathic medication, pt declined. She wished to continue with the current treatment plan. I refilled her Gabitril 4 mg BID. I will see the patient back after the block or earlier if needed.     Written by Tarik Verdugo, medical scribe, as dictated by Aaron Hendrix MD  I reviewed and agree with the note.

## 2024-07-08 ENCOUNTER — TELEPHONE (OUTPATIENT)
Age: 74
End: 2024-07-08

## 2024-07-08 NOTE — TELEPHONE ENCOUNTER
Patient went to the hospital last night for a blood clot and was placed on blood thinners. Her and her daughter just want to check and make sure the medications we prescribe are safe to take with Ribaroxavan (Xarelto).    Please review and advise, [phone]

## 2024-07-08 NOTE — TELEPHONE ENCOUNTER
The pt was identified using 2 pt identifiers. I informed the pts daughter Bailey that it is okay to take Gabitril and the Xaretlo. She understands and also asked if this would affect her mothers injection coming up next week.  I let her know I was not sure but there is a chance it would have to be rescheduled. I let the pts daughter know I would let Rachael our  know and have her reach out to the pt. Pt understands

## 2024-07-08 NOTE — TELEPHONE ENCOUNTER
Pt was put on Xarelto due to a blood clot by the ER. Pt was wondering if the Gabitril 4 mg is okay to be taken with the Xarelto.

## 2024-07-12 DIAGNOSIS — M47.816 SPONDYLOSIS WITHOUT MYELOPATHY OR RADICULOPATHY, LUMBAR REGION: ICD-10-CM

## 2024-07-12 DIAGNOSIS — M43.10 SPONDYLOLISTHESIS, ACQUIRED: ICD-10-CM

## 2024-07-12 DIAGNOSIS — M51.36 DDD (DEGENERATIVE DISC DISEASE), LUMBAR: ICD-10-CM

## 2024-07-12 DIAGNOSIS — M54.16 LUMBAR NEURITIS: ICD-10-CM

## 2024-07-12 DIAGNOSIS — M48.061 SPINAL STENOSIS, LUMBAR REGION WITHOUT NEUROGENIC CLAUDICATION: ICD-10-CM

## 2024-07-12 NOTE — TELEPHONE ENCOUNTER
Last filled: 4/16/2024, one tablet BID with 1 refill  Last seen: 6/21/2024 IMPRESSION AND PLAN:    She wished to continue with the current treatment plan. I refilled her Gabitril 4 mg BID. I will see the patient back after the block or earlier if needed.     Refill was never sent in please approve new Rx

## 2024-07-14 RX ORDER — TIAGABINE HYDROCHLORIDE 4 MG/1
4 TABLET, FILM COATED ORAL 2 TIMES DAILY
Qty: 60 TABLET | Refills: 1 | Status: SHIPPED | OUTPATIENT
Start: 2024-07-14

## 2024-07-18 ENCOUNTER — TELEPHONE (OUTPATIENT)
Age: 74
End: 2024-07-18

## 2024-07-18 NOTE — TELEPHONE ENCOUNTER
Pts daughter Bailey called to make me aware that her mother went to the ER yesterady and was diagnosed with DVT and put on Xarelto. Pt will need to hold off on block for at least 6 months per Cardiology/ order cancelled

## 2024-10-09 ENCOUNTER — OFFICE VISIT (OUTPATIENT)
Age: 74
End: 2024-10-09
Payer: MEDICARE

## 2024-10-09 VITALS
BODY MASS INDEX: 41.66 KG/M2 | HEART RATE: 55 BPM | OXYGEN SATURATION: 100 % | WEIGHT: 244 LBS | TEMPERATURE: 98 F | HEIGHT: 64 IN

## 2024-10-09 DIAGNOSIS — M48.061 SPINAL STENOSIS, LUMBAR REGION WITHOUT NEUROGENIC CLAUDICATION: ICD-10-CM

## 2024-10-09 DIAGNOSIS — M51.362 DEGENERATION OF INTERVERTEBRAL DISC OF LUMBAR REGION WITH DISCOGENIC BACK PAIN AND LOWER EXTREMITY PAIN: ICD-10-CM

## 2024-10-09 DIAGNOSIS — M43.10 SPONDYLOLISTHESIS, ACQUIRED: ICD-10-CM

## 2024-10-09 DIAGNOSIS — M54.16 LUMBAR NEURITIS: ICD-10-CM

## 2024-10-09 DIAGNOSIS — M47.816 SPONDYLOSIS WITHOUT MYELOPATHY OR RADICULOPATHY, LUMBAR REGION: Primary | ICD-10-CM

## 2024-10-09 PROCEDURE — G8400 PT W/DXA NO RESULTS DOC: HCPCS | Performed by: PHYSICAL MEDICINE & REHABILITATION

## 2024-10-09 PROCEDURE — G8427 DOCREV CUR MEDS BY ELIG CLIN: HCPCS | Performed by: PHYSICAL MEDICINE & REHABILITATION

## 2024-10-09 PROCEDURE — 1036F TOBACCO NON-USER: CPT | Performed by: PHYSICAL MEDICINE & REHABILITATION

## 2024-10-09 PROCEDURE — G8484 FLU IMMUNIZE NO ADMIN: HCPCS | Performed by: PHYSICAL MEDICINE & REHABILITATION

## 2024-10-09 PROCEDURE — 1123F ACP DISCUSS/DSCN MKR DOCD: CPT | Performed by: PHYSICAL MEDICINE & REHABILITATION

## 2024-10-09 PROCEDURE — 1090F PRES/ABSN URINE INCON ASSESS: CPT | Performed by: PHYSICAL MEDICINE & REHABILITATION

## 2024-10-09 PROCEDURE — 99214 OFFICE O/P EST MOD 30 MIN: CPT | Performed by: PHYSICAL MEDICINE & REHABILITATION

## 2024-10-09 PROCEDURE — G8417 CALC BMI ABV UP PARAM F/U: HCPCS | Performed by: PHYSICAL MEDICINE & REHABILITATION

## 2024-10-09 RX ORDER — IRBESARTAN 150 MG/1
150 TABLET ORAL NIGHTLY
COMMUNITY
Start: 2024-06-26

## 2024-10-09 RX ORDER — MOMETASONE FUROATE MONOHYDRATE 50 UG/1
2 SPRAY, METERED NASAL DAILY
COMMUNITY
Start: 2024-09-11

## 2024-10-09 RX ORDER — AMLODIPINE BESYLATE 10 MG/1
10 TABLET ORAL DAILY
COMMUNITY
Start: 2024-06-26

## 2024-10-09 RX ORDER — SENNOSIDES A AND B 8.6 MG/1
2 TABLET, FILM COATED ORAL
COMMUNITY
Start: 2024-07-02

## 2024-10-09 RX ORDER — RIVAROXABAN 20 MG/1
20 TABLET, FILM COATED ORAL DAILY
COMMUNITY

## 2024-10-09 RX ORDER — TIAGABINE HYDROCHLORIDE 4 MG/1
4 TABLET, FILM COATED ORAL 2 TIMES DAILY
Qty: 60 TABLET | Refills: 2 | Status: SHIPPED | OUTPATIENT
Start: 2024-10-09

## 2024-10-09 NOTE — PROGRESS NOTES
[facility]  [address]  [place] [id number]  Tel: [phone]  Fax: [phone]          PROGRESS NOTE      HISTORY OF PRESENT ILLNESS:  The patient is a 74 y.o. female and was seen today for follow up of left foot pain and LLE pain in a L4 distribution to the foot x 01/2023. Patient denies low back pain. Previously seen for bilateral foot pain on the plantar surface. Previously seen for low back pain radiating into the BLE (L>R) in a L4 distribution to the ankle x 8/2021. Previously seen for low back pain. Previously seen for low back  pain radiating into the BLE (RLE>LLE) in a L4 distribution to the foot involving all digits. Previously, she was seen for chronic low back pain radiating into the BLE to the mid thigh (previously, to the knee) in a L4 distribution (R>L) x  18 years. Her pain is exacerbated with standing and walking. Relieved with sitting. Positive shopping cart sign. Additionally, she reports falling onto her knees (R>L) due to a broken railing x 4/2020 with increased pain. Per pt, she had a MRI within  the past year, but is unsure where or when it was done. No films or reports are available for review. Patient is a poor historian. She denies h/o kidney issues. She is currently on Asprin. Per pt, she previously had vascular surgery BLE. She reports bladder incontinence x 3 months, her symptoms have started to improve and she is followed by a urologist. Pt has been treating with extra  strength Tylenol with some relief. Patient previously tolerated Lyrica 50 mg; patient was intolerant to Lyrica 75 mg (dizziness). Pt reported intolerance to NEURONTIN 800 mg TID. She completed the MDP with relief. Pt underwent epidural L4-5 on 6/16/2020. Pt failed to f/u after block. She recalls relief with the block lasting until 11/14/2020.  Pt  underwent epidural L4-5 on 2/9/2021 with good relief.  Pt underwent bilateral L4 SNRBs on 5/17/2022 with benefit,

## 2025-01-28 RX ORDER — TIAGABINE HYDROCHLORIDE 4 MG/1
4 TABLET, FILM COATED ORAL 2 TIMES DAILY
Qty: 60 TABLET | Refills: 0 | Status: SHIPPED | OUTPATIENT
Start: 2025-01-28

## 2025-01-29 NOTE — TELEPHONE ENCOUNTER
Attempt to call patient, left generic message to return call.    Please schedule patient with Dr Hendrix for future medication refills.